# Patient Record
Sex: FEMALE | Race: WHITE | Employment: FULL TIME | URBAN - METROPOLITAN AREA
[De-identification: names, ages, dates, MRNs, and addresses within clinical notes are randomized per-mention and may not be internally consistent; named-entity substitution may affect disease eponyms.]

---

## 2021-11-01 PROCEDURE — 99284 EMERGENCY DEPT VISIT MOD MDM: CPT

## 2021-11-01 PROCEDURE — 75810000059 HC BURN CR DRS/DEB MD 5-10%TBSA

## 2021-11-02 ENCOUNTER — HOSPITAL ENCOUNTER (EMERGENCY)
Age: 41
Discharge: HOME | End: 2021-11-02
Attending: EMERGENCY MEDICINE

## 2021-11-02 VITALS
HEIGHT: 67 IN | BODY MASS INDEX: 21.19 KG/M2 | DIASTOLIC BLOOD PRESSURE: 84 MMHG | SYSTOLIC BLOOD PRESSURE: 122 MMHG | TEMPERATURE: 98.4 F | RESPIRATION RATE: 18 BRPM | WEIGHT: 135 LBS | OXYGEN SATURATION: 99 % | HEART RATE: 98 BPM

## 2021-11-02 DIAGNOSIS — T30.0 BURN: Primary | ICD-10-CM

## 2021-11-02 PROCEDURE — 75810000059 HC BURN CR DRS/DEB MD 5-10%TBSA

## 2021-11-02 RX ORDER — IBUPROFEN 800 MG/1
800 TABLET ORAL
Qty: 30 TABLET | Refills: 0 | Status: SHIPPED | OUTPATIENT
Start: 2021-11-02 | End: 2021-11-12

## 2021-11-02 RX ORDER — TRAMADOL HYDROCHLORIDE 50 MG/1
50 TABLET ORAL
Qty: 9 TABLET | Refills: 0 | Status: SHIPPED | OUTPATIENT
Start: 2021-11-02 | End: 2021-11-05

## 2021-11-02 NOTE — ED NOTES
I have reviewed discharge instructions with the patient. The patient verbalized understanding. Patient left ED via Discharge Method: ambulatory to Home with self    Opportunity for questions and clarification provided. Patient given 2 scripts. To continue your aftercare when you leave the hospital, you may receive an automated call from our care team to check in on how you are doing. This is a free service and part of our promise to provide the best care and service to meet your aftercare needs.  If you have questions, or wish to unsubscribe from this service please call [phone]. Thank you for Choosing our [facility] Emergency Department.

## 2021-11-02 NOTE — LETTER
[address] [address] EMERGENCY DEPT 
[address] [address] [facility] [id number] [phone] Work/School Note Date: 11/1/2021 To Whom It May concern: 
 
 
Rebekah Foley was seen and treated today in the emergency room by the following provider(s): 
Attending Provider: Patrice Ball DO Physician Assistant: RUFUS Goldberg. Rebekah Foley is excused from work/school on 11/02/21. She is clear to return to work/school on 11/03/21. Sincerely, RUFUS Simpson

## 2021-11-02 NOTE — DISCHARGE INSTRUCTIONS
Keep Clean place antibiotic ointment daily, use meds as directed, call office in the morning to arrange follow-up appointment, return to ER symptoms worsen

## 2021-11-02 NOTE — ED PROVIDER NOTES
Patient states Sunday while working she spilled hot coffee grounds on the top of her left hand pain continued today she came in [facility] for further evaluation. She is updated on tetanus    The history is provided by the patient. Burn  The incident occurred yesterday. The burns occurred at the workplace. The burns occurred while cooking. Injury mechanism: Hot coffee grounds and steam. The burns are located on the left hand. The burns appear redness. The pain is at a severity of 5/10. The pain is moderate. She has tried running under water for the symptoms. The treatment provided moderate relief. No past medical history on file. No past surgical history on file. No family history on file. Social History     Socioeconomic History    Marital status: SINGLE     Spouse name: Not on file    Number of children: Not on file    Years of education: Not on file    Highest education level: Not on file   Occupational History    Not on file   Tobacco Use    Smoking status: Not on file   Substance and Sexual Activity    Alcohol use: Not on file    Drug use: Not on file    Sexual activity: Not on file   Other Topics Concern    Not on file   Social History Narrative    Not on file     Social Determinants of Health     Financial Resource Strain:     Difficulty of Paying Living Expenses:    Food Insecurity:     Worried About Running Out of Food in the Last Year:     [address] in the Last Year:    Transportation Needs:     Lack of Transportation (Medical):      Lack of Transportation (Non-Medical):    Physical Activity:     Days of Exercise per Week:     Minutes of Exercise per Session:    Stress:     Feeling of Stress :    Social Connections:     Frequency of Communication with Friends and Family:     Frequency of Social Gatherings with Friends and Family:     Attends Confucianism Services:     Active Member of Clubs or Organizations:     Attends Club or Organization Meetings:     Marital Status:    Intimate Partner Violence:     Fear of Current or Ex-Partner:     Emotionally Abused:     Physically Abused:     Sexually Abused: ALLERGIES: Tegretol [carbamazepine] and Bactrim [sulfamethoprim]    Review of Systems   All other systems reviewed and are negative. Vitals:    11/02/21 0017 11/02/21 0023   BP: 126/82    Pulse: (!) 110    Resp: 20    Temp: 98.4 °F (36.9 °C)    SpO2: 99% 99%   Weight: 61.2 kg (135 lb)    Height: 5' 7\" (1.702 m)             Physical Exam  Vitals and nursing note reviewed. Constitutional:       General: She is not in acute distress. Appearance: Normal appearance. She is well-developed. She is not diaphoretic. HENT:      Head: Normocephalic and atraumatic. Right Ear: External ear normal.      Left Ear: External ear normal.   Eyes:      Pupils: Pupils are equal, round, and reactive to light. Cardiovascular:      Rate and Rhythm: Normal rate and regular rhythm. Pulmonary:      Effort: Pulmonary effort is normal.      Breath sounds: Normal breath sounds. Abdominal:      General: Bowel sounds are normal.      Palpations: Abdomen is soft. Musculoskeletal:         General: Normal range of motion. Cervical back: Normal range of motion and neck supple. Skin:     General: Skin is warm. Comments: Left hand with dorsal redness webspaces are clear you shallow burst blisters at the thumb dried drainage noted range of motion of all fingers no injury to the palm redness does not cross the wrist   Neurological:      General: No focal deficit present. Mental Status: She is alert and oriented to person, place, and time. Psychiatric:         Mood and Affect: Mood normal.         Behavior: Behavior normal.          MDM  Number of Diagnoses or Management Options  Diagnosis management comments: First-degree and slight second.   Burn to dorsal left hand patient is up-to-date on tetanus  Area dressed with antibiotic ointment, patient given 800 mg of Motrin and few tramadol for pain  Work Note given, patient to follow-up at work well       Amount and/or Complexity of Data Reviewed  Review and summarize past medical records: yes    Risk of Complications, Morbidity, and/or Mortality  Presenting problems: low  Diagnostic procedures: low  Management options: low    Patient Progress  Patient progress: improved         Procedures

## 2021-11-27 ENCOUNTER — HOSPITAL ENCOUNTER (EMERGENCY)
Age: 41
Discharge: HOME | End: 2021-11-27
Attending: EMERGENCY MEDICINE

## 2021-11-27 VITALS
RESPIRATION RATE: 16 BRPM | HEART RATE: 119 BPM | OXYGEN SATURATION: 98 % | DIASTOLIC BLOOD PRESSURE: 63 MMHG | WEIGHT: 130 LBS | BODY MASS INDEX: 20.4 KG/M2 | TEMPERATURE: 98.2 F | SYSTOLIC BLOOD PRESSURE: 111 MMHG | HEIGHT: 67 IN

## 2021-11-27 DIAGNOSIS — H92.01 ACUTE EAR PAIN, RIGHT: Primary | ICD-10-CM

## 2021-11-27 DIAGNOSIS — H60.501 ACUTE OTITIS EXTERNA OF RIGHT EAR, UNSPECIFIED TYPE: ICD-10-CM

## 2021-11-27 DIAGNOSIS — H66.90 ACUTE OTITIS MEDIA, UNSPECIFIED OTITIS MEDIA TYPE: ICD-10-CM

## 2021-11-27 PROCEDURE — 99282 EMERGENCY DEPT VISIT SF MDM: CPT

## 2021-11-27 RX ORDER — NEOMYCIN SULFATE, POLYMYXIN B SULFATE AND HYDROCORTISONE 10; 3.5; 1 MG/ML; MG/ML; [USP'U]/ML
4 SUSPENSION/ DROPS AURICULAR (OTIC) 4 TIMES DAILY
Qty: 10 ML | Refills: 0 | Status: SHIPPED | OUTPATIENT
Start: 2021-11-27 | End: 2021-12-07

## 2021-11-27 RX ORDER — DOXYCYCLINE 100 MG/1
100 TABLET ORAL 2 TIMES DAILY
Qty: 20 TABLET | Refills: 0 | Status: SHIPPED | OUTPATIENT
Start: 2021-11-27

## 2021-11-27 RX ORDER — HYDROCODONE BITARTRATE AND ACETAMINOPHEN 5; 325 MG/1; MG/1
1 TABLET ORAL
Qty: 12 TABLET | Refills: 0 | Status: SHIPPED | OUTPATIENT
Start: 2021-11-27 | End: 2021-11-30

## 2021-11-27 NOTE — ED TRIAGE NOTES
Pt states she has been having some swelling on the inside of her R ear. She states slightly runny nose. Patient states she also has been having skin issues but applied make up to face before being triaged.    Ibuprofen 800mg taken PTA

## 2021-11-27 NOTE — ED PROVIDER NOTES
Per nurses notes: \"Pt states she has been having some swelling on the inside of her R ear. She states slightly runny nose. Patient states she also has been having skin issues but applied make up to face before being triaged. Ibuprofen 800mg taken PTA\"    Patient states the acne/skin condition to the face is usually treated with doxycycline. Has a history of staph infections in the past    The history is provided by the patient. Ear Pain   This is a new problem. The current episode started more than 2 days ago. The problem occurs constantly. The problem has been gradually worsening. Patient complains that the right ear is affected. There has been no fever. The pain is at a severity of 9/10. Pertinent negatives include no ear discharge, no headaches, no hearing loss, no rhinorrhea, no sore throat, no abdominal pain, no diarrhea, no vomiting, no neck pain, no cough and no rash. Her past medical history does not include chronic ear infection, hearing loss or tympanostomy tube. History reviewed. No pertinent past medical history. History reviewed. No pertinent surgical history. History reviewed. No pertinent family history.     Social History     Socioeconomic History    Marital status: SINGLE     Spouse name: Not on file    Number of children: Not on file    Years of education: Not on file    Highest education level: Not on file   Occupational History    Not on file   Tobacco Use    Smoking status: Current Every Day Smoker     Packs/day: 0.30    Smokeless tobacco: Never Used   Substance and Sexual Activity    Alcohol use: Not on file    Drug use: Not Currently    Sexual activity: Not on file   Other Topics Concern    Not on file   Social History Narrative    Not on file     Social Determinants of Health     Financial Resource Strain:     Difficulty of Paying Living Expenses: Not on file   Food Insecurity:     Worried About Running Out of Food in the Last Year: Not on file    [employer] in the Last Year: Not on file   Transportation Needs:     Lack of Transportation (Medical): Not on file    Lack of Transportation (Non-Medical): Not on file   Physical Activity:     Days of Exercise per Week: Not on file    Minutes of Exercise per Session: Not on file   Stress:     Feeling of Stress : Not on file   Social Connections:     Frequency of Communication with Friends and Family: Not on file    Frequency of Social Gatherings with Friends and Family: Not on file    Attends Adventism Services: Not on file    Active Member of [address] [place] [address] [employer] or Organizations: Not on file    Attends Club or Organization Meetings: Not on file    Marital Status: Not on file   Intimate Partner Violence:     Fear of Current or Ex-Partner: Not on file    Emotionally Abused: Not on file    Physically Abused: Not on file    Sexually Abused: Not on file   Housing Stability:     Unable to Pay for Housing in the Last Year: Not on file    Number of [place] in the Last Year: Not on file    Unstable Housing in the Last Year: Not on file         ALLERGIES: Tegretol [carbamazepine] and Bactrim [sulfamethoprim]    Review of Systems   Constitutional: Negative for chills and fever. HENT: Positive for ear pain. Negative for ear discharge, hearing loss, rhinorrhea and sore throat. Respiratory: Negative for cough. Gastrointestinal: Negative for abdominal pain, diarrhea and vomiting. Musculoskeletal: Negative for neck pain. Skin: Negative for rash. Neurological: Negative for headaches. All other systems reviewed and are negative. Vitals:    11/27/21 0431   BP: 111/63   Pulse: (!) 119   Resp: 16   Temp: 98.2 °F (36.8 °C)   SpO2: 98%   Weight: 59 kg (130 lb)   Height: 5' 7\" (1.702 m)            Physical Exam  Vitals and nursing note reviewed. Constitutional:       General: She is not in acute distress. Appearance: She is well-developed. HENT:      Head: Normocephalic and atraumatic.       Right Ear: Hearing and external ear normal. Swelling and tenderness present. Tympanic membrane is injected and bulging (With a couple large bullae). Left Ear: Hearing and external ear normal.      Mouth/Throat:      Mouth: Mucous membranes are moist.   Eyes:      Extraocular Movements: Extraocular movements intact. Conjunctiva/sclera: Conjunctivae normal.      Pupils: Pupils are equal, round, and reactive to light. Musculoskeletal:         General: Normal range of motion. Cervical back: Normal range of motion and neck supple. Lymphadenopathy:      Cervical: No cervical adenopathy. Skin:     General: Skin is warm and dry. Capillary Refill: Capillary refill takes less than 2 seconds. Findings: Acne present. Neurological:      Mental Status: She is alert and oriented to person, place, and time.       Gait: Gait normal.   Psychiatric:         Speech: Speech normal.         Cognition and Memory: Cognition and memory normal.          MDM  Number of Diagnoses or Management Options  Acute ear pain, right: new and does not require workup  Acute otitis externa of right ear, unspecified type: new and does not require workup  Acute otitis media, unspecified otitis media type: new and does not require workup     Amount and/or Complexity of Data Reviewed  Review and summarize past medical records: yes    Risk of Complications, Morbidity, and/or Mortality  Presenting problems: low  Diagnostic procedures: minimal  Management options: low    Patient Progress  Patient progress: stable         Procedures

## 2023-01-13 ENCOUNTER — HOSPITAL ENCOUNTER (INPATIENT)
Age: 43
LOS: 1 days | Discharge: LEFT BEFORE DISCHARGE | End: 2023-01-14
Attending: EMERGENCY MEDICINE | Admitting: STUDENT IN AN ORGANIZED HEALTH CARE EDUCATION/TRAINING PROGRAM
Payer: COMMERCIAL

## 2023-01-13 VITALS
RESPIRATION RATE: 18 BRPM | DIASTOLIC BLOOD PRESSURE: 92 MMHG | SYSTOLIC BLOOD PRESSURE: 125 MMHG | OXYGEN SATURATION: 100 % | HEIGHT: 67 IN | HEART RATE: 107 BPM | TEMPERATURE: 98.8 F | WEIGHT: 135 LBS | BODY MASS INDEX: 21.19 KG/M2

## 2023-01-13 DIAGNOSIS — M46.42 DISCITIS OF CERVICAL REGION: Primary | ICD-10-CM

## 2023-01-13 PROBLEM — M46.22 OSTEOMYELITIS OF CERVICAL SPINE (HCC): Status: ACTIVE | Noted: 2023-01-13

## 2023-01-13 LAB
ALBUMIN SERPL-MCNC: 3.7 G/DL (ref 3.5–5)
ALBUMIN/GLOB SERPL: 0.8 (ref 0.4–1.6)
ALP SERPL-CCNC: 101 U/L (ref 50–136)
ALT SERPL-CCNC: 32 U/L (ref 12–65)
AMPHET UR QL SCN: POSITIVE
ANION GAP SERPL CALC-SCNC: 4 MMOL/L (ref 2–11)
AST SERPL-CCNC: 28 U/L (ref 15–37)
BARBITURATES UR QL SCN: NEGATIVE
BASOPHILS # BLD: 0 K/UL (ref 0–0.2)
BASOPHILS NFR BLD: 1 % (ref 0–2)
BENZODIAZ UR QL: POSITIVE
BILIRUB SERPL-MCNC: 0.3 MG/DL (ref 0.2–1.1)
BILIRUB UR QL: NEGATIVE
BUN SERPL-MCNC: 9 MG/DL (ref 6–23)
CALCIUM SERPL-MCNC: 10.5 MG/DL (ref 8.3–10.4)
CANNABINOIDS UR QL SCN: NEGATIVE
CHLORIDE SERPL-SCNC: 102 MMOL/L (ref 101–110)
CO2 SERPL-SCNC: 29 MMOL/L (ref 21–32)
COCAINE UR QL SCN: NEGATIVE
CREAT SERPL-MCNC: 0.7 MG/DL (ref 0.6–1)
CRP SERPL-MCNC: 0.8 MG/DL (ref 0–0.9)
DIFFERENTIAL METHOD BLD: NORMAL
EOSINOPHIL # BLD: 0.2 K/UL (ref 0–0.8)
EOSINOPHIL NFR BLD: 4 % (ref 0.5–7.8)
ERYTHROCYTE [DISTWIDTH] IN BLOOD BY AUTOMATED COUNT: 13.2 % (ref 11.9–14.6)
GLOBULIN SER CALC-MCNC: 4.8 G/DL (ref 2.8–4.5)
GLUCOSE SERPL-MCNC: 83 MG/DL (ref 65–100)
GLUCOSE UR QL STRIP.AUTO: NEGATIVE MG/DL
HCG UR QL: NEGATIVE
HCT VFR BLD AUTO: 40 % (ref 35.8–46.3)
HGB BLD-MCNC: 13.2 G/DL (ref 11.7–15.4)
IMM GRANULOCYTES # BLD AUTO: 0 K/UL (ref 0–0.5)
IMM GRANULOCYTES NFR BLD AUTO: 0 % (ref 0–5)
KETONES UR-MCNC: NEGATIVE MG/DL
LEUKOCYTE ESTERASE UR QL STRIP: NEGATIVE
LYMPHOCYTES # BLD: 1.6 K/UL (ref 0.5–4.6)
LYMPHOCYTES NFR BLD: 28 % (ref 13–44)
MCH RBC QN AUTO: 28.2 PG (ref 26.1–32.9)
MCHC RBC AUTO-ENTMCNC: 33 G/DL (ref 31.4–35)
MCV RBC AUTO: 85.5 FL (ref 82–102)
METHADONE UR QL: NEGATIVE
MONOCYTES # BLD: 0.5 K/UL (ref 0.1–1.3)
MONOCYTES NFR BLD: 8 % (ref 4–12)
NEUTS SEG # BLD: 3.5 K/UL (ref 1.7–8.2)
NEUTS SEG NFR BLD: 59 % (ref 43–78)
NITRITE UR QL: NEGATIVE
NRBC # BLD: 0 K/UL (ref 0–0.2)
OPIATES UR QL: NEGATIVE
PCP UR QL: NEGATIVE
PH UR: 6.5 (ref 5–9)
PLATELET # BLD AUTO: 159 K/UL (ref 150–450)
PMV BLD AUTO: 10.5 FL (ref 9.4–12.3)
POTASSIUM SERPL-SCNC: 3.7 MMOL/L (ref 3.5–5.1)
PROT SERPL-MCNC: 8.5 G/DL (ref 6.3–8.2)
PROT UR QL: NEGATIVE MG/DL
RBC # BLD AUTO: 4.68 M/UL (ref 4.05–5.2)
RBC # UR STRIP: NEGATIVE
SERVICE CMNT-IMP: ABNORMAL
SODIUM SERPL-SCNC: 135 MMOL/L (ref 133–143)
SP GR UR: >1.03 (ref 1–1.02)
UROBILINOGEN UR QL: 0.2 EU/DL (ref 0.2–1)
VANCOMYCIN SERPL-MCNC: 1.2 UG/ML
WBC # BLD AUTO: 5.8 K/UL (ref 4.3–11.1)

## 2023-01-13 PROCEDURE — 80053 COMPREHEN METABOLIC PANEL: CPT

## 2023-01-13 PROCEDURE — 6370000000 HC RX 637 (ALT 250 FOR IP): Performed by: FAMILY MEDICINE

## 2023-01-13 PROCEDURE — 6370000000 HC RX 637 (ALT 250 FOR IP): Performed by: STUDENT IN AN ORGANIZED HEALTH CARE EDUCATION/TRAINING PROGRAM

## 2023-01-13 PROCEDURE — 81003 URINALYSIS AUTO W/O SCOPE: CPT

## 2023-01-13 PROCEDURE — 80202 ASSAY OF VANCOMYCIN: CPT

## 2023-01-13 PROCEDURE — 81025 URINE PREGNANCY TEST: CPT

## 2023-01-13 PROCEDURE — 6360000002 HC RX W HCPCS: Performed by: STUDENT IN AN ORGANIZED HEALTH CARE EDUCATION/TRAINING PROGRAM

## 2023-01-13 PROCEDURE — 87040 BLOOD CULTURE FOR BACTERIA: CPT

## 2023-01-13 PROCEDURE — 85025 COMPLETE CBC W/AUTO DIFF WBC: CPT

## 2023-01-13 PROCEDURE — 2580000003 HC RX 258: Performed by: STUDENT IN AN ORGANIZED HEALTH CARE EDUCATION/TRAINING PROGRAM

## 2023-01-13 PROCEDURE — 99285 EMERGENCY DEPT VISIT HI MDM: CPT

## 2023-01-13 PROCEDURE — 86140 C-REACTIVE PROTEIN: CPT

## 2023-01-13 PROCEDURE — 1100000000 HC RM PRIVATE

## 2023-01-13 PROCEDURE — 80307 DRUG TEST PRSMV CHEM ANLYZR: CPT

## 2023-01-13 RX ORDER — SODIUM CHLORIDE 0.9 % (FLUSH) 0.9 %
5-40 SYRINGE (ML) INJECTION EVERY 12 HOURS SCHEDULED
Status: DISCONTINUED | OUTPATIENT
Start: 2023-01-13 | End: 2023-01-14 | Stop reason: HOSPADM

## 2023-01-13 RX ORDER — ENOXAPARIN SODIUM 100 MG/ML
40 INJECTION SUBCUTANEOUS DAILY
Status: DISCONTINUED | OUTPATIENT
Start: 2023-01-13 | End: 2023-01-14 | Stop reason: HOSPADM

## 2023-01-13 RX ORDER — LORAZEPAM 2 MG/ML
2 INJECTION INTRAMUSCULAR
Status: DISCONTINUED | OUTPATIENT
Start: 2023-01-13 | End: 2023-01-14 | Stop reason: HOSPADM

## 2023-01-13 RX ORDER — LORAZEPAM 2 MG/ML
1 INJECTION INTRAMUSCULAR
Status: DISCONTINUED | OUTPATIENT
Start: 2023-01-13 | End: 2023-01-14 | Stop reason: HOSPADM

## 2023-01-13 RX ORDER — SODIUM CHLORIDE 0.9 % (FLUSH) 0.9 %
5-40 SYRINGE (ML) INJECTION PRN
Status: DISCONTINUED | OUTPATIENT
Start: 2023-01-13 | End: 2023-01-14 | Stop reason: HOSPADM

## 2023-01-13 RX ORDER — LORAZEPAM 2 MG/ML
4 INJECTION INTRAMUSCULAR
Status: DISCONTINUED | OUTPATIENT
Start: 2023-01-13 | End: 2023-01-14 | Stop reason: HOSPADM

## 2023-01-13 RX ORDER — SODIUM CHLORIDE 9 MG/ML
INJECTION, SOLUTION INTRAVENOUS PRN
Status: DISCONTINUED | OUTPATIENT
Start: 2023-01-13 | End: 2023-01-14 | Stop reason: HOSPADM

## 2023-01-13 RX ORDER — LORAZEPAM 1 MG/1
4 TABLET ORAL
Status: DISCONTINUED | OUTPATIENT
Start: 2023-01-13 | End: 2023-01-14 | Stop reason: HOSPADM

## 2023-01-13 RX ORDER — ACETAMINOPHEN 650 MG/1
650 SUPPOSITORY RECTAL EVERY 6 HOURS PRN
Status: DISCONTINUED | OUTPATIENT
Start: 2023-01-13 | End: 2023-01-14 | Stop reason: HOSPADM

## 2023-01-13 RX ORDER — LORAZEPAM 0.5 MG/1
1 TABLET ORAL
Status: DISCONTINUED | OUTPATIENT
Start: 2023-01-13 | End: 2023-01-14 | Stop reason: HOSPADM

## 2023-01-13 RX ORDER — LORAZEPAM 1 MG/1
3 TABLET ORAL
Status: DISCONTINUED | OUTPATIENT
Start: 2023-01-13 | End: 2023-01-14 | Stop reason: HOSPADM

## 2023-01-13 RX ORDER — LORAZEPAM 0.5 MG/1
2 TABLET ORAL
Status: DISCONTINUED | OUTPATIENT
Start: 2023-01-13 | End: 2023-01-14 | Stop reason: HOSPADM

## 2023-01-13 RX ORDER — ONDANSETRON 2 MG/ML
4 INJECTION INTRAMUSCULAR; INTRAVENOUS EVERY 6 HOURS PRN
Status: DISCONTINUED | OUTPATIENT
Start: 2023-01-13 | End: 2023-01-14 | Stop reason: HOSPADM

## 2023-01-13 RX ORDER — SODIUM CHLORIDE 9 MG/ML
INJECTION, SOLUTION INTRAVENOUS CONTINUOUS
Status: DISCONTINUED | OUTPATIENT
Start: 2023-01-13 | End: 2023-01-14 | Stop reason: HOSPADM

## 2023-01-13 RX ORDER — ONDANSETRON 4 MG/1
4 TABLET, ORALLY DISINTEGRATING ORAL EVERY 8 HOURS PRN
Status: DISCONTINUED | OUTPATIENT
Start: 2023-01-13 | End: 2023-01-14 | Stop reason: HOSPADM

## 2023-01-13 RX ORDER — ACETAMINOPHEN 325 MG/1
650 TABLET ORAL EVERY 6 HOURS PRN
Status: DISCONTINUED | OUTPATIENT
Start: 2023-01-13 | End: 2023-01-14 | Stop reason: HOSPADM

## 2023-01-13 RX ORDER — LORAZEPAM 2 MG/ML
3 INJECTION INTRAMUSCULAR
Status: DISCONTINUED | OUTPATIENT
Start: 2023-01-13 | End: 2023-01-14 | Stop reason: HOSPADM

## 2023-01-13 RX ORDER — METHADONE HYDROCHLORIDE 10 MG/1
10 TABLET ORAL EVERY 4 HOURS PRN
COMMUNITY

## 2023-01-13 RX ORDER — LANOLIN ALCOHOL/MO/W.PET/CERES
100 CREAM (GRAM) TOPICAL DAILY
Status: DISCONTINUED | OUTPATIENT
Start: 2023-01-13 | End: 2023-01-14 | Stop reason: HOSPADM

## 2023-01-13 RX ORDER — POLYETHYLENE GLYCOL 3350 17 G/17G
17 POWDER, FOR SOLUTION ORAL DAILY PRN
Status: DISCONTINUED | OUTPATIENT
Start: 2023-01-13 | End: 2023-01-14 | Stop reason: HOSPADM

## 2023-01-13 RX ORDER — NICOTINE 21 MG/24HR
1 PATCH, TRANSDERMAL 24 HOURS TRANSDERMAL DAILY PRN
Status: DISCONTINUED | OUTPATIENT
Start: 2023-01-13 | End: 2023-01-14 | Stop reason: HOSPADM

## 2023-01-13 RX ORDER — MULTIVITAMIN WITH IRON
1 TABLET ORAL DAILY
Status: DISCONTINUED | OUTPATIENT
Start: 2023-01-13 | End: 2023-01-14 | Stop reason: HOSPADM

## 2023-01-13 RX ORDER — SODIUM CHLORIDE 9 MG/ML
INJECTION, SOLUTION INTRAVENOUS PRN
Status: DISCONTINUED | OUTPATIENT
Start: 2023-01-13 | End: 2023-01-13 | Stop reason: SDUPTHER

## 2023-01-13 RX ADMIN — ACETAMINOPHEN 650 MG: 325 TABLET ORAL at 21:56

## 2023-01-13 RX ADMIN — CEFEPIME 2000 MG: 2 INJECTION, POWDER, FOR SOLUTION INTRAVENOUS at 18:42

## 2023-01-13 RX ADMIN — VANCOMYCIN HYDROCHLORIDE 1500 MG: 100 INJECTION, POWDER, LYOPHILIZED, FOR SOLUTION INTRAVENOUS at 19:44

## 2023-01-13 RX ADMIN — SODIUM CHLORIDE: 9 INJECTION, SOLUTION INTRAVENOUS at 15:10

## 2023-01-13 RX ADMIN — B-COMPLEX W/ C & FOLIC ACID TAB 1 TABLET: TAB at 17:56

## 2023-01-13 RX ADMIN — Medication 100 MG: at 17:56

## 2023-01-13 RX ADMIN — SODIUM CHLORIDE: 9 INJECTION, SOLUTION INTRAVENOUS at 17:57

## 2023-01-13 RX ADMIN — ENOXAPARIN SODIUM 40 MG: 100 INJECTION SUBCUTANEOUS at 15:10

## 2023-01-13 ASSESSMENT — PAIN DESCRIPTION - DESCRIPTORS: DESCRIPTORS: ACHING

## 2023-01-13 ASSESSMENT — PAIN - FUNCTIONAL ASSESSMENT: PAIN_FUNCTIONAL_ASSESSMENT: ACTIVITIES ARE NOT PREVENTED

## 2023-01-13 ASSESSMENT — ENCOUNTER SYMPTOMS
SHORTNESS OF BREATH: 0
COUGH: 0
CHEST TIGHTNESS: 0

## 2023-01-13 ASSESSMENT — PAIN DESCRIPTION - LOCATION: LOCATION: NECK

## 2023-01-13 ASSESSMENT — PAIN SCALES - GENERAL
PAINLEVEL_OUTOF10: 5
PAINLEVEL_OUTOF10: 9

## 2023-01-13 ASSESSMENT — PAIN DESCRIPTION - ORIENTATION: ORIENTATION: MID

## 2023-01-13 NOTE — ED PROVIDER NOTES
Emergency Department Provider Note                   PCP:                None None               Age: 38 y.o. Sex: female       ICD-10-CM    1. Discitis of cervical region  M46.42           DISPOSITION Decision To Admit 01/13/2023 11:16:37 AM        Medical Decision Making  Patient without a clear history other than test results demonstrating discitis. Patient has been seen at multiple hospitals and apparently left AGAINST MEDICAL ADVICE from each hospital and occasionally using an alias. Will repeat blood work including blood cultures, discussed with hospitalist for possible admission and continued IV vancomycin. Amount and/or Complexity of Data Reviewed  Labs: ordered. Decision-making details documented in ED Course. Risk  Decision regarding hospitalization. Complexity of Problem: 1 acute uncomplicated illness requiring admission. (3)      I have reviewed records from an external source: ED records from outside this hospital.  I have reviewed records from an external source: previous lab results from outside ED. I have reviewed records from an external source: previous imaging studies from outside ED. I have reviewed records from an external source: [facility]          Patient was admitted I have communication with admitting physician. Orders Placed This Encounter   Procedures    Culture, Blood 1    CBC with Auto Differential    Comprehensive Metabolic Panel    C-Reactive Protein    Urine Drug Screen    POCT Urine Dipstick    POC PREGNANCY UR-QUAL    POCT Urinalysis no Micro    POC Pregnancy Urine Qual        Medications - No data to display    New Prescriptions    No medications on file        Ruth Arias is a 38 y.o. female who presents to the Emergency Department with chief complaint of    Chief Complaint   Patient presents with    Osteomyelitis       Patient presents to the emergency department with a complaint of neck pain.   She states she was just hospitalized at [facility] but left AGAINST MEDICAL ADVICE because she did not like how she was being treated. Stating that she was in the hallway and had only had 2 meals in 2 days. She had an history of heroin overdose and seen in the emergency department and subsequently diagnosed with discitis and possibly osteomyelitis of the cervical spine on MRI. She was receiving IV vancomycin prior to her leaving AMA. Patient gives a somewhat disjointed and confusing history. Review of the medical record from [facility] indicates patient had been admitted previously at our facility with the same diagnosis and the patient states that she used her cousin's name during that admission. A search of medical records under her cousin's name does not confirm this admission. She states that she left [place] tomorrow yesterday and has been at home until she could get a ride to this hospital.  She denies any paresthesias, fever or chills. Review of medical records also indicate a echocardiogram performed with some nonspecific inflammation of aortic valve leaflet although vegetation cannot be ruled out. The history is provided by the patient and medical records. Review of Systems   Constitutional:  Negative for chills and fever. Respiratory:  Negative for cough, chest tightness and shortness of breath. Cardiovascular:  Negative for chest pain. Musculoskeletal:  Positive for neck pain. All other systems reviewed and are negative. No past medical history on file. No past surgical history on file. No family history on file.      Social History     Socioeconomic History    Marital status: Single   Tobacco Use    Smoking status: Every Day     Packs/day: 0.30     Types: Cigarettes    Smokeless tobacco: Never   Substance and Sexual Activity    Drug use: Not Currently         Carbamazepine and Sulfamethoxazole-trimethoprim     Previous Medications    DOXYCYCLINE MONOHYDRATE (ADOXA) 100 MG TABLET    Take 100 mg by mouth 2 times daily        Vitals signs and nursing note reviewed. Patient Vitals for the past 4 hrs:   Temp Pulse Resp BP SpO2   01/13/23 0939 98 °F (36.7 °C) (!) 102 18 126/88 96 %          Physical Exam  Vitals and nursing note reviewed. Constitutional:       General: She is not in acute distress. Appearance: Normal appearance. She is normal weight. She is not ill-appearing, toxic-appearing or diaphoretic. HENT:      Head: Normocephalic and atraumatic. Eyes:      Extraocular Movements: Extraocular movements intact. Conjunctiva/sclera: Conjunctivae normal.      Pupils: Pupils are equal, round, and reactive to light. Cardiovascular:      Rate and Rhythm: Regular rhythm. Tachycardia present. Pulmonary:      Effort: Pulmonary effort is normal.   Abdominal:      General: There is no distension. Palpations: Abdomen is soft. Tenderness: There is no abdominal tenderness. There is no right CVA tenderness, left CVA tenderness or guarding. Musculoskeletal:         General: Normal range of motion. Cervical back: Normal range of motion and neck supple. Tenderness present. No rigidity. Lymphadenopathy:      Cervical: No cervical adenopathy. Skin:     General: Skin is warm and dry. Capillary Refill: Capillary refill takes less than 2 seconds. Neurological:      General: No focal deficit present. Mental Status: She is alert and oriented to person, place, and time. Mental status is at baseline. Psychiatric:         Mood and Affect: Mood normal.         Behavior: Behavior normal.         Thought Content:  Thought content normal.        Procedures    Results for orders placed or performed during the hospital encounter of 01/13/23   Culture, Blood 1    Specimen: Blood   Result Value Ref Range    Special Requests RIGHT  FOREARM        Culture PENDING    CBC with Auto Differential   Result Value Ref Range    WBC 5.8 4.3 - 11.1 K/uL    RBC 4.68 4.05 - 5.2 M/uL    Hemoglobin 13.2 11.7 - 15.4 g/dL    Hematocrit 40.0 35.8 - 46.3 %    MCV 85.5 82 - 102 FL    MCH 28.2 26.1 - 32.9 PG    MCHC 33.0 31.4 - 35.0 g/dL    RDW 13.2 11.9 - 14.6 %    Platelets 197 839 - 594 K/uL    MPV 10.5 9.4 - 12.3 FL    nRBC 0.00 0.0 - 0.2 K/uL    Differential Type AUTOMATED      Seg Neutrophils 59 43 - 78 %    Lymphocytes 28 13 - 44 %    Monocytes 8 4.0 - 12.0 %    Eosinophils % 4 0.5 - 7.8 %    Basophils 1 0.0 - 2.0 %    Immature Granulocytes 0 0.0 - 5.0 %    Segs Absolute 3.5 1.7 - 8.2 K/UL    Absolute Lymph # 1.6 0.5 - 4.6 K/UL    Absolute Mono # 0.5 0.1 - 1.3 K/UL    Absolute Eos # 0.2 0.0 - 0.8 K/UL    Basophils Absolute 0.0 0.0 - 0.2 K/UL    Absolute Immature Granulocyte 0.0 0.0 - 0.5 K/UL   Comprehensive Metabolic Panel   Result Value Ref Range    Sodium 135 133 - 143 mmol/L    Potassium 3.7 3.5 - 5.1 mmol/L    Chloride 102 101 - 110 mmol/L    CO2 29 21 - 32 mmol/L    Anion Gap 4 2 - 11 mmol/L    Glucose 83 65 - 100 mg/dL    BUN 9 6 - 23 MG/DL    Creatinine 0.70 0.6 - 1.0 MG/DL    Est, Glom Filt Rate >60 >60 ml/min/1.73m2    Calcium 10.5 (H) 8.3 - 10.4 MG/DL    Total Bilirubin 0.3 0.2 - 1.1 MG/DL    ALT 32 12 - 65 U/L    AST 28 15 - 37 U/L    Alk Phosphatase 101 50 - 136 U/L    Total Protein 8.5 (H) 6.3 - 8.2 g/dL    Albumin 3.7 3.5 - 5.0 g/dL    Globulin 4.8 (H) 2.8 - 4.5 g/dL    Albumin/Globulin Ratio 0.8 0.4 - 1.6     C-Reactive Protein   Result Value Ref Range    CRP 0.8 0.0 - 0.9 mg/dL   Urine Drug Screen   Result Value Ref Range    PCP, Urine Negative NEG      Benzodiazepines, Urine Positive (A) NEG      Cocaine, Urine Negative NEG      Amphetamine, Urine Positive (A) NEG      Methadone, Urine Negative NEG      THC, TH-Cannabinol, Urine Negative NEG      Opiates, Urine Negative NEG      Barbiturates, Urine Negative NEG     POCT Urinalysis no Micro   Result Value Ref Range    Specific Gravity, Urine, POC >1.030 (H) 1.001 - 1.023    pH, Urine, POC 6.5 5.0 - 9.0      Protein, Urine, POC Negative NEG mg/dL Glucose, UA POC Negative NEG mg/dL    Ketones, Urine, POC Negative NEG mg/dL    Bilirubin, Urine, POC Negative NEG      Blood, UA POC Negative NEG      URINE UROBILINOGEN POC 0.2 0.2 - 1.0 EU/dL    Nitrate, Urine, POC Negative NEG      Leukocyte Est, UA POC Negative NEG      Performed by: Lavonne Rey    POC Pregnancy Urine Qual   Result Value Ref Range    Preg Test, Ur Negative NEG          No orders to display                       Voice dictation software was used during the making of this note. This software is not perfect and grammatical and other typographical errors may be present. This note has not been completely proofread for errors.      Yaneth Pulido, DO  01/13/23 1127

## 2023-01-13 NOTE — ED TRIAGE NOTES
Pt arrives ambulatory from home. States she was seen at [facility] and dx'd with cervical osteomyelitis.   Left AMA

## 2023-01-13 NOTE — ED NOTES
TRANSFER - OUT REPORT:    Verbal report given to [facility] on Tim Osorio  being transferred to 7th floor for routine progression of patient care       Report consisted of patient's Situation, Background, Assessment and   Recommendations(SBAR). Information from the following report(s) ED SBAR was reviewed with the receiving nurse. [place] Assessment: No data recorded  Lines:   Peripheral IV 01/13/23 Right Forearm (Active)   Site Assessment Clean, dry & intact 01/13/23 1017   Line Status Blood return noted 01/13/23 1017   Phlebitis Assessment No symptoms 01/13/23 1017   Infiltration Assessment 0 01/13/23 1017        Opportunity for questions and clarification was provided.       Patient transported with:  Monitor           Gini Conway RN  01/13/23 2981

## 2023-01-13 NOTE — H&P
Hospitalist History and Physical   Admit Date:  2023 10:23 AM   Name:  Misael Ramires   Age:  38 y.o. Sex:  female  :  1980   MRN:  [id number]   Room:  [place] [address]    Presenting Complaint: Osteomyelitis      Reason(s) for Admission: Osteomyelitis of cervical spine ([facility] [M46.22]     History of Present Illness:   Misael Ramires is a 38 y.o. female with medical history of Polysubstance abuse presents to the ED with a complaint of neck pain. She is a very poor historian. History is obtained from her girlfriend at the bedside. As per girlfriend, patient had a heroin overdose 4-5 days back. She was unresponsive and was laying on the floor. She hit her head. Her face was purplish in color with clenched teeth and drooling of the eyes. She called EMS and they gave her instructions to perform CPR. She performed CPR for 9 minutes. EMS gave her 3 doses of Narcan and she woke up. She was saturating 80% at that time. She was sent to [facility] but left  [address] because she did not like how she was being treated and accused them that they were not telling her what was happening with her and didn't discuss about the imagings done there. She was diagnosed with discitis and possibly osteomyelitis of the cervical spine on MRI. She was receiving IV vancomycin prior to her leaving AMA. She was complaining of subjective fever and was taking ibuprofen. She is also complaining of chest pain but it was due to CPR performed on her. Patient stated she took heroine 5 days back and it was $20 worth. She was asking for pain medication for her neck pain. Patient denies chills, nausea, vomiting, diarrhea. Assessment & Plan:   Osteomyelitis of cervical spine (HCC)  Subjective fever at home, no leukocytosis  MRI of the thoracic and  lumbar spine was normal at [facility].  MRI Findings concerning for osteomyelitis discitis at C2-C3.    echocardiogram performed with some nonspecific inflammation of aortic valve leaflet although vegetation cannot be ruled out. Continue vancomycin  Follow-up with blood cultures  ID consulted  Cardiology consulted    Polysubstance abuse  U tox positive for amphetamines and opiates  Security checked her belongings and found mx used insulin needles, old angiocaths, various unused angiocaths, lighters.  consulted        Anticipated discharge needs:   Anticipate hospital stay for 1 to 2 days    Diet: ADULT DIET; Regular  VTE ppx: Lovenox  Code status: Full Code    Hospital Problems:  Principal Problem:    Osteomyelitis of cervical spine ([address]  Resolved Problems:    * No resolved hospital problems. *       Past History:     No past medical history on file. No past surgical history on file. Social History     Tobacco Use    Smoking status: Every Day     Packs/day: 0.30     Types: Cigarettes    Smokeless tobacco: Never   Substance Use Topics    Alcohol use: Not on file      Social History     Substance and Sexual Activity   Drug Use Not Currently       No family history on file. There is no immunization history on file for this patient. Allergies   Allergen Reactions    Carbamazepine Other (See Comments)    Sulfamethoxazole-Trimethoprim Shortness Of Breath     Prior to Admit Medications:  Current Outpatient Medications   Medication Instructions    doxycycline monohydrate (ADOXA) 100 mg, Oral, 2 TIMES DAILY         Objective:   Patient Vitals for the past 24 hrs:   Temp Pulse Resp BP SpO2   01/13/23 0939 98 °F (36.7 °C) (!) 102 18 126/88 96 %       Oxygen Therapy  SpO2: 96 %  O2 Device: None (Room air)    Estimated body mass index is 21.14 kg/m² as calculated from the following:    Height as of this encounter: 5' 7\" (1.702 m). Weight as of this encounter: 135 lb (61.2 kg).   No intake or output data in the 24 hours ending 01/13/23 1337      Physical Exam:    Blood pressure 126/88, pulse (!) 102, temperature 98 °F (36.7 °C), temperature source Oral, resp. rate 18, height 5' 7\" (1.702 m), weight 135 lb (61.2 kg), SpO2 96 %. General:    Well nourished. Head:  Normocephalic, atraumatic, multiple whitish spots on the face. Eyes:  Sclerae appear normal.  Pupils equally round. ENT:  Nares appear normal.  Moist oral mucosa  Neck:  No restricted ROM. Trachea midline, Skin nodule on the back of the neck, non tender, no warmth  CV:   RRR. No m/r/g. No jugular venous distension. Lungs:   CTAB. No wheezing, rhonchi, or rales. Symmetric expansion. Abdomen:   Soft, nontender, nondistended. Extremities: No cyanosis or clubbing. No edema  Skin:     No rashes and normal coloration. Warm and dry. Neuro:  CN II-XII grossly intact. Sensation intact. Psych:  Normal mood and affect.       I have personally reviewed labs and tests showing:  Recent Labs:  Recent Results (from the past 24 hour(s))   CBC with Auto Differential    Collection Time: 01/13/23  9:58 AM   Result Value Ref Range    WBC 5.8 4.3 - 11.1 K/uL    RBC 4.68 4.05 - 5.2 M/uL    Hemoglobin 13.2 11.7 - 15.4 g/dL    Hematocrit 40.0 35.8 - 46.3 %    MCV 85.5 82 - 102 FL    MCH 28.2 26.1 - 32.9 PG    MCHC 33.0 31.4 - 35.0 g/dL    RDW 13.2 11.9 - 14.6 %    Platelets 115 101 - 207 K/uL    MPV 10.5 9.4 - 12.3 FL    nRBC 0.00 0.0 - 0.2 K/uL    Differential Type AUTOMATED      Seg Neutrophils 59 43 - 78 %    Lymphocytes 28 13 - 44 %    Monocytes 8 4.0 - 12.0 %    Eosinophils % 4 0.5 - 7.8 %    Basophils 1 0.0 - 2.0 %    Immature Granulocytes 0 0.0 - 5.0 %    Segs Absolute 3.5 1.7 - 8.2 K/UL    Absolute Lymph # 1.6 0.5 - 4.6 K/UL    Absolute Mono # 0.5 0.1 - 1.3 K/UL    Absolute Eos # 0.2 0.0 - 0.8 K/UL    Basophils Absolute 0.0 0.0 - 0.2 K/UL    Absolute Immature Granulocyte 0.0 0.0 - 0.5 K/UL   Comprehensive Metabolic Panel    Collection Time: 01/13/23  9:58 AM   Result Value Ref Range    Sodium 135 133 - 143 mmol/L    Potassium 3.7 3.5 - 5.1 mmol/L    Chloride 102 101 - 110 mmol/L    CO2 29 21 - 32 mmol/L    Anion Gap 4 2 - 11 mmol/L    Glucose 83 65 - 100 mg/dL    BUN 9 6 - 23 MG/DL    Creatinine 0.70 0.6 - 1.0 MG/DL    Est, Glom Filt Rate >60 >60 ml/min/1.73m2    Calcium 10.5 (H) 8.3 - 10.4 MG/DL    Total Bilirubin 0.3 0.2 - 1.1 MG/DL    ALT 32 12 - 65 U/L    AST 28 15 - 37 U/L    Alk Phosphatase 101 50 - 136 U/L    Total Protein 8.5 (H) 6.3 - 8.2 g/dL    Albumin 3.7 3.5 - 5.0 g/dL    Globulin 4.8 (H) 2.8 - 4.5 g/dL    Albumin/Globulin Ratio 0.8 0.4 - 1.6     C-Reactive Protein    Collection Time: 01/13/23  9:58 AM   Result Value Ref Range    CRP 0.8 0.0 - 0.9 mg/dL   Culture, Blood 1    Collection Time: 01/13/23  9:58 AM    Specimen: Blood   Result Value Ref Range    Special Requests RIGHT  FOREARM        Culture PENDING    Urine Drug Screen    Collection Time: 01/13/23  9:58 AM   Result Value Ref Range    PCP, Urine Negative NEG      Benzodiazepines, Urine Positive (A) NEG      Cocaine, Urine Negative NEG      Amphetamine, Urine Positive (A) NEG      Methadone, Urine Negative NEG      THC, TH-Cannabinol, Urine Negative NEG      Opiates, Urine Negative NEG      Barbiturates, Urine Negative NEG     Vancomycin Level, Random    Collection Time: 01/13/23  9:58 AM   Result Value Ref Range    Vancomycin Rm 1.2 UG/ML   POCT Urinalysis no Micro    Collection Time: 01/13/23 10:01 AM   Result Value Ref Range    Specific Gravity, Urine, POC >1.030 (H) 1.001 - 1.023    pH, Urine, POC 6.5 5.0 - 9.0      Protein, Urine, POC Negative NEG mg/dL    Glucose, UA POC Negative NEG mg/dL    Ketones, Urine, POC Negative NEG mg/dL    Bilirubin, Urine, POC Negative NEG      Blood, UA POC Negative NEG      URINE UROBILINOGEN POC 0.2 0.2 - 1.0 EU/dL    Nitrate, Urine, POC Negative NEG      Leukocyte Est, UA POC Negative NEG      Performed by: [facility] [place]    POC Pregnancy Urine Qual    Collection Time: 01/13/23 10:03 AM   Result Value Ref Range    Preg Test, Ur Negative NEG         I have personally reviewed imaging studies showing:  No results found. Echocardiogram:  No results found for this or any previous visit. Orders Placed This Encounter   Medications    sodium chloride flush 0.9 % injection 5-40 mL    sodium chloride flush 0.9 % injection 5-40 mL    0.9 % sodium chloride infusion    enoxaparin (LOVENOX) injection 40 mg     Order Specific Question:   Indication of Use     Answer:   Prophylaxis-DVT/PE    OR Linked Order Group     ondansetron (ZOFRAN-ODT) disintegrating tablet 4 mg     ondansetron (ZOFRAN) injection 4 mg    polyethylene glycol (GLYCOLAX) packet 17 g    OR Linked Order Group     acetaminophen (TYLENOL) tablet 650 mg     acetaminophen (TYLENOL) suppository 650 mg         Signed:  Tamy Spangler MD    Part of this note may have been written by using a voice dictation software. The note has been proof read but may still contain some grammatical/other typographical errors.

## 2023-01-13 NOTE — ED NOTES
Physician concerned for substance abuse, security officers checked patient belongings, mx used insulin needles, old angiocaths, various unused angiocaths, lighters and cupped tin foil found in patient belongings, lighters removed, all other items disposed of.       Gini Conway RN  01/13/23 2145

## 2023-01-14 NOTE — PROGRESS NOTES
Pt left against medical leave but refused to sign form. Pt was informed of the risks of leaving ama. Hospitalist Dr. Stanford Etienne notified. House supervisor notified. Pt voiced understanding of the risks of leaving. This was witnessed by myself, charge nurse, house supervisor, and security. All Ivs and lines were removed. Denies pain and discomfort at time. No signs and symptoms of acute distress. Escorted off premises by security.

## 2023-01-14 NOTE — PROGRESS NOTES
VANCO DAILY FOLLOW UP NOTE  [address] [facility] Pharmacokinetic Monitoring Service - Vancomycin    Consulting Provider: Dr. Shahbaz Velasco   Indication: Suspected Osteomyelitis  Target Concentration: Goal AUC/NICOLE 400-600 mg*hr/L  Day of Therapy: 1  Additional Antimicrobials: cefepime    Patient eligible for piperacillin-tazobactam to cefepime auto-substitution per P&T approved protocol? N/A    Pertinent Laboratory Values: Wt Readings from Last 1 Encounters:   01/13/23 135 lb (61.2 kg)     Temp Readings from Last 1 Encounters:   01/13/23 98.1 °F (36.7 °C) (Oral)     Recent Labs     01/13/23  0958   BUN 9   CREATININE 0.70   WBC 5.8     Estimated Creatinine Clearance: 103 mL/min (based on SCr of 0.7 mg/dL). Lab Results   Component Value Date/Time    VANCORANDOM 1.2 01/13/2023 09:58 AM       MRSA Nasal Swab: N/A. Non-respiratory infection      Assessment:  Date/Time Dose Concentration AUC         Note: Serum concentrations collected for AUC dosing may appear elevated if collected in close proximity to the dose administered, this is not necessarily an indication of toxicity    Plan:  Dosing recommendations based on [address]  Patient was given vancomycin at [facility] before leaving [facility]. Random level @ 0958 resulted at 1.2. Will reload patient.   Start vancomycin 1500 mg x 1, then 1000 mg q12h  Anticipated AUC of 498 and trough concentration of 14.6 at steady state  Renal labs as indicated   Vancomycin concentrations will be ordered as clinically appropriate   Pharmacy will continue to monitor patient and adjust therapy as indicated    Thank you for the consult,  Darylene Ee, [facility]

## 2023-01-14 NOTE — PLAN OF CARE
Problem: Discharge Planning  Goal: Discharge to home or other facility with appropriate resources  Outcome: Progressing  Flowsheets (Taken 1/13/2023 1949)  Discharge to home or other facility with appropriate resources: Identify barriers to discharge with patient and caregiver     Problem: Pain  Goal: Verbalizes/displays adequate comfort level or baseline comfort level  Outcome: Progressing  Flowsheets (Taken 1/13/2023 2156)  Verbalizes/displays adequate comfort level or baseline comfort level:   Assess pain using appropriate pain scale   Consider cultural and social influences on pain and pain management     Problem: Risk for Elopement  Goal: Patient will not exit the unit/facility without proper excort  Outcome: Progressing  Flowsheets  Taken 1/13/2023 2300  Nursing Interventions for Elopement Risk: Communicate/escalate to charge nurse the risk of elopement  Taken 1/13/2023 1949  Nursing Interventions for Elopement Risk: Assist with personal care needs such as toileting, eating, dressing, as needed to reduce the risk of wandering     Problem: Skin/Tissue Integrity - Adult  Goal: Skin integrity remains intact  Outcome: Progressing  Flowsheets (Taken 1/13/2023 1949)  Skin Integrity Remains Intact: Monitor for areas of redness and/or skin breakdown  Goal: Incisions, wounds, or drain sites healing without S/S of infection  Outcome: Progressing  Flowsheets (Taken 1/13/2023 1949)  Incisions, Wounds, or Drain Sites Healing Without Sign and Symptoms of Infection: ADMISSION and DAILY: Assess and document risk factors for pressure ulcer development  Goal: Oral mucous membranes remain intact  Outcome: Progressing  Flowsheets (Taken 1/13/2023 1949)  Oral Mucous Membranes Remain Intact: Assess oral mucosa and hygiene practices     Problem: Coping  Goal: Pt/Family able to verbalize concerns and demonstrate effective coping strategies  Description: INTERVENTIONS:  1.  Assist patient/family to identify coping skills, available support systems and cultural and spiritual values  2. Provide emotional support, including active listening and acknowledgement of concerns of patient and caregivers  3. Reduce environmental stimuli, as able  4. Instruct patient/family in relaxation techniques, as appropriate  5. Assess for spiritual pain/suffering and initiate Spiritual Care, Psychosocial Clinical Specialist consults as needed  Outcome: Progressing  Flowsheets (Taken 1/13/2023 1949)  Patient/family able to verbalize anxieties, fears, and concerns, and demonstrate effective coping: Assist patient/family to identify coping skills, available support systems and cultural and spiritual values     Problem: Behavior  Goal: Pt/Family maintain appropriate behavior and adhere to behavioral management agreement, if implemented  Description: INTERVENTIONS:  1. Assess patient/family's coping skills and  non-compliant behavior (including use of illegal substances)  2. Notify security of behavior or suspected illegal substances which indicate the need for search of the family and/or belongings  3. Encourage verbalization of thoughts and concerns in a socially appropriate manner  4. Utilize positive, consistent limit setting strategies supporting safety of patient, staff and others  5. Encourage participation in the decision making process about the behavioral management agreement  6. If a visitor's behavior poses a threat to safety call refer to organization policy.   7. Initiate consult with , Psychosocial CNS, Spiritual Care as appropriate  Outcome: Progressing  Flowsheets (Taken 1/13/2023 1949)  Patient/family maintains appropriate behavior and adheres to behavioral management agreement, if implemented: Assess patient/familys coping skills and  non-compliant behavior (including use of illegal substances)     Problem: Drug Abuse/Detox  Goal: Will have no detox symptoms and will verbalize plan for changing drug-related behavior  Description: INTERVENTIONS:  1. Administer medication as ordered  2. Monitor physical status  3. Provide emotional support with 1:1 interaction with staff  4.  Encourage  recovery focused treatment   Outcome: Progressing  Flowsheets (Taken 1/13/2023 1949)  Will have no detox symptoms and will verbalize plan for changing drug-related behavior: Administer medication as ordered

## 2023-01-15 LAB
BACTERIA SPEC CULT: NORMAL
SERVICE CMNT-IMP: NORMAL

## 2023-01-18 LAB
BACTERIA SPEC CULT: NORMAL
SERVICE CMNT-IMP: NORMAL

## 2023-03-22 ENCOUNTER — APPOINTMENT (OUTPATIENT)
Dept: MRI IMAGING | Age: 43
End: 2023-03-22
Payer: COMMERCIAL

## 2023-03-22 ENCOUNTER — HOSPITAL ENCOUNTER (EMERGENCY)
Age: 43
Discharge: ANOTHER HOSPITAL | End: 2023-03-23
Attending: STUDENT IN AN ORGANIZED HEALTH CARE EDUCATION/TRAINING PROGRAM
Payer: COMMERCIAL

## 2023-03-22 VITALS
DIASTOLIC BLOOD PRESSURE: 70 MMHG | RESPIRATION RATE: 11 BRPM | OXYGEN SATURATION: 98 % | HEART RATE: 88 BPM | TEMPERATURE: 97.8 F | SYSTOLIC BLOOD PRESSURE: 125 MMHG

## 2023-03-22 DIAGNOSIS — M54.2 NECK PAIN ON RIGHT SIDE: ICD-10-CM

## 2023-03-22 DIAGNOSIS — M86.9 OSTEOMYELITIS OF OTHER SITE, UNSPECIFIED TYPE (HCC): Primary | ICD-10-CM

## 2023-03-22 DIAGNOSIS — F15.10 METHAMPHETAMINE USE (HCC): ICD-10-CM

## 2023-03-22 PROBLEM — M46.42 DISCITIS OF CERVICAL REGION: Status: ACTIVE | Noted: 2023-01-11

## 2023-03-22 PROBLEM — T40.1X1A ACCIDENTAL OVERDOSE OF HEROIN (HCC): Status: ACTIVE | Noted: 2023-01-11

## 2023-03-22 LAB
ALBUMIN SERPL-MCNC: 4.2 G/DL (ref 3.5–5)
ALBUMIN/GLOB SERPL: 1 (ref 0.4–1.6)
ALP SERPL-CCNC: 91 U/L (ref 50–130)
ALT SERPL-CCNC: 68 U/L (ref 12–65)
AMPHET UR QL SCN: POSITIVE
ANION GAP SERPL CALC-SCNC: 3 MMOL/L (ref 2–11)
APPEARANCE UR: CLEAR
AST SERPL-CCNC: 52 U/L (ref 15–37)
BARBITURATES UR QL SCN: NEGATIVE
BASOPHILS # BLD: 0 K/UL (ref 0–0.2)
BASOPHILS NFR BLD: 1 % (ref 0–2)
BENZODIAZ UR QL: NEGATIVE
BILIRUB SERPL-MCNC: 0.4 MG/DL (ref 0.2–1.1)
BILIRUB UR QL: NEGATIVE
BUN SERPL-MCNC: 7 MG/DL (ref 6–23)
CALCIUM SERPL-MCNC: 9.6 MG/DL (ref 8.3–10.4)
CANNABINOIDS UR QL SCN: NEGATIVE
CHLORIDE SERPL-SCNC: 102 MMOL/L (ref 101–110)
CO2 SERPL-SCNC: 32 MMOL/L (ref 21–32)
COCAINE UR QL SCN: NEGATIVE
COLOR UR: NORMAL
CREAT SERPL-MCNC: 0.64 MG/DL (ref 0.6–1)
DIFFERENTIAL METHOD BLD: ABNORMAL
EOSINOPHIL # BLD: 0.1 K/UL (ref 0–0.8)
EOSINOPHIL NFR BLD: 2 % (ref 0.5–7.8)
ERYTHROCYTE [DISTWIDTH] IN BLOOD BY AUTOMATED COUNT: 13.5 % (ref 11.9–14.6)
ERYTHROCYTE [SEDIMENTATION RATE] IN BLOOD: 11 MM/HR (ref 0–20)
GLOBULIN SER CALC-MCNC: 4.2 G/DL (ref 2.8–4.5)
GLUCOSE SERPL-MCNC: 108 MG/DL (ref 65–100)
GLUCOSE UR STRIP.AUTO-MCNC: NEGATIVE MG/DL
HCG UR QL: NEGATIVE
HCT VFR BLD AUTO: 43 % (ref 35.8–46.3)
HGB BLD-MCNC: 13.8 G/DL (ref 11.7–15.4)
HGB UR QL STRIP: NEGATIVE
IMM GRANULOCYTES # BLD AUTO: 0 K/UL (ref 0–0.5)
IMM GRANULOCYTES NFR BLD AUTO: 0 % (ref 0–5)
KETONES UR QL STRIP.AUTO: NEGATIVE MG/DL
LACTATE SERPL-SCNC: 1.4 MMOL/L (ref 0.4–2)
LEUKOCYTE ESTERASE UR QL STRIP.AUTO: NEGATIVE
LYMPHOCYTES # BLD: 1.3 K/UL (ref 0.5–4.6)
LYMPHOCYTES NFR BLD: 23 % (ref 13–44)
MCH RBC QN AUTO: 28 PG (ref 26.1–32.9)
MCHC RBC AUTO-ENTMCNC: 32.1 G/DL (ref 31.4–35)
MCV RBC AUTO: 87.4 FL (ref 82–102)
METHADONE UR QL: NEGATIVE
MONOCYTES # BLD: 0.3 K/UL (ref 0.1–1.3)
MONOCYTES NFR BLD: 6 % (ref 4–12)
NEUTS SEG # BLD: 3.8 K/UL (ref 1.7–8.2)
NEUTS SEG NFR BLD: 68 % (ref 43–78)
NITRITE UR QL STRIP.AUTO: NEGATIVE
NRBC # BLD: 0 K/UL (ref 0–0.2)
OPIATES UR QL: NEGATIVE
PCP UR QL: NEGATIVE
PH UR STRIP: 7 (ref 5–9)
PLATELET # BLD AUTO: 131 K/UL (ref 150–450)
PMV BLD AUTO: 11.4 FL (ref 9.4–12.3)
POTASSIUM SERPL-SCNC: 3.9 MMOL/L (ref 3.5–5.1)
PROCALCITONIN SERPL-MCNC: 0.08 NG/ML (ref 0–0.49)
PROT SERPL-MCNC: 8.4 G/DL (ref 6.3–8.2)
PROT UR STRIP-MCNC: NEGATIVE MG/DL
RBC # BLD AUTO: 4.92 M/UL (ref 4.05–5.2)
SODIUM SERPL-SCNC: 137 MMOL/L (ref 133–143)
SP GR UR REFRACTOMETRY: 1.01 (ref 1–1.02)
UROBILINOGEN UR QL STRIP.AUTO: 0.2 EU/DL (ref 0.2–1)
WBC # BLD AUTO: 5.5 K/UL (ref 4.3–11.1)

## 2023-03-22 PROCEDURE — 87040 BLOOD CULTURE FOR BACTERIA: CPT

## 2023-03-22 PROCEDURE — 93005 ELECTROCARDIOGRAM TRACING: CPT

## 2023-03-22 PROCEDURE — 72146 MRI CHEST SPINE W/O DYE: CPT

## 2023-03-22 PROCEDURE — 80307 DRUG TEST PRSMV CHEM ANLYZR: CPT

## 2023-03-22 PROCEDURE — 6360000002 HC RX W HCPCS

## 2023-03-22 PROCEDURE — 83605 ASSAY OF LACTIC ACID: CPT

## 2023-03-22 PROCEDURE — 81025 URINE PREGNANCY TEST: CPT

## 2023-03-22 PROCEDURE — 96366 THER/PROPH/DIAG IV INF ADDON: CPT

## 2023-03-22 PROCEDURE — 80053 COMPREHEN METABOLIC PANEL: CPT

## 2023-03-22 PROCEDURE — 81003 URINALYSIS AUTO W/O SCOPE: CPT

## 2023-03-22 PROCEDURE — 96365 THER/PROPH/DIAG IV INF INIT: CPT

## 2023-03-22 PROCEDURE — 96374 THER/PROPH/DIAG INJ IV PUSH: CPT

## 2023-03-22 PROCEDURE — 99285 EMERGENCY DEPT VISIT HI MDM: CPT

## 2023-03-22 PROCEDURE — 85652 RBC SED RATE AUTOMATED: CPT

## 2023-03-22 PROCEDURE — 6370000000 HC RX 637 (ALT 250 FOR IP)

## 2023-03-22 PROCEDURE — 85025 COMPLETE CBC W/AUTO DIFF WBC: CPT

## 2023-03-22 PROCEDURE — 84145 PROCALCITONIN (PCT): CPT

## 2023-03-22 PROCEDURE — 72141 MRI NECK SPINE W/O DYE: CPT

## 2023-03-22 PROCEDURE — 2580000003 HC RX 258

## 2023-03-22 PROCEDURE — 96375 TX/PRO/DX INJ NEW DRUG ADDON: CPT

## 2023-03-22 RX ORDER — HALOPERIDOL 5 MG/ML
5 INJECTION INTRAMUSCULAR ONCE
Status: COMPLETED | OUTPATIENT
Start: 2023-03-22 | End: 2023-03-22

## 2023-03-22 RX ORDER — HALOPERIDOL 5 MG/ML
5 INJECTION INTRAMUSCULAR
Status: DISCONTINUED | OUTPATIENT
Start: 2023-03-22 | End: 2023-03-22

## 2023-03-22 RX ORDER — 0.9 % SODIUM CHLORIDE 0.9 %
1000 INTRAVENOUS SOLUTION INTRAVENOUS ONCE
Status: COMPLETED | OUTPATIENT
Start: 2023-03-22 | End: 2023-03-22

## 2023-03-22 RX ORDER — DIAZEPAM 5 MG/1
5 TABLET ORAL ONCE
Status: COMPLETED | OUTPATIENT
Start: 2023-03-22 | End: 2023-03-22

## 2023-03-22 RX ORDER — DIPHENHYDRAMINE HYDROCHLORIDE 50 MG/ML
25 INJECTION INTRAMUSCULAR; INTRAVENOUS
Status: DISCONTINUED | OUTPATIENT
Start: 2023-03-22 | End: 2023-03-22

## 2023-03-22 RX ORDER — DROPERIDOL 2.5 MG/ML
1.25 INJECTION, SOLUTION INTRAMUSCULAR; INTRAVENOUS EVERY 6 HOURS PRN
Status: DISCONTINUED | OUTPATIENT
Start: 2023-03-22 | End: 2023-03-22

## 2023-03-22 RX ORDER — HALOPERIDOL 5 MG/ML
INJECTION INTRAMUSCULAR
Status: DISCONTINUED
Start: 2023-03-22 | End: 2023-03-23 | Stop reason: HOSPADM

## 2023-03-22 RX ORDER — DIPHENHYDRAMINE HYDROCHLORIDE 50 MG/ML
INJECTION INTRAMUSCULAR; INTRAVENOUS
Status: DISCONTINUED
Start: 2023-03-22 | End: 2023-03-22

## 2023-03-22 RX ORDER — DIPHENHYDRAMINE HYDROCHLORIDE 50 MG/ML
12.5 INJECTION INTRAMUSCULAR; INTRAVENOUS
Status: COMPLETED | OUTPATIENT
Start: 2023-03-22 | End: 2023-03-22

## 2023-03-22 RX ADMIN — DIAZEPAM 5 MG: 5 TABLET ORAL at 16:57

## 2023-03-22 RX ADMIN — DIPHENHYDRAMINE HYDROCHLORIDE 12.5 MG: 50 INJECTION, SOLUTION INTRAMUSCULAR; INTRAVENOUS at 18:08

## 2023-03-22 RX ADMIN — HALOPERIDOL LACTATE 5 MG: 5 INJECTION, SOLUTION INTRAMUSCULAR at 19:04

## 2023-03-22 RX ADMIN — SODIUM CHLORIDE 1000 ML: 900 INJECTION, SOLUTION INTRAVENOUS at 20:01

## 2023-03-22 RX ADMIN — VANCOMYCIN HYDROCHLORIDE 1250 MG: 10 INJECTION, POWDER, LYOPHILIZED, FOR SOLUTION INTRAVENOUS at 19:59

## 2023-03-22 ASSESSMENT — ENCOUNTER SYMPTOMS
COLOR CHANGE: 0
VOMITING: 0
ABDOMINAL PAIN: 0
SHORTNESS OF BREATH: 0
NAUSEA: 0
DIARRHEA: 0

## 2023-03-22 NOTE — ED TRIAGE NOTES
Patient ambulatory to triage. Patient c\o R sided neck pain that began a few weeks ago. Patient denies any injury or trauma. Patient states she had an infection in her spinal cord previous.

## 2023-03-22 NOTE — ED PROVIDER NOTES
Has not finished IV antibiotics. Has left the hospital AMA both at [facility] and our downtown ER. The history is provided by the patient. No  was used. Review of Systems   Constitutional:  Negative for chills, fatigue and fever. Respiratory:  Negative for shortness of breath. Cardiovascular:  Negative for chest pain and palpitations. Gastrointestinal:  Negative for abdominal pain, diarrhea, nausea and vomiting. Musculoskeletal:  Positive for neck pain. Skin:  Negative for color change. Patient states she has some sores on the back part of her left upper thigh that she noticed just today   Neurological:  Negative for weakness and headaches. All other systems reviewed and are negative. Vitals signs and nursing note reviewed:  Patient Vitals for the past 4 hrs:   Pulse Resp BP SpO2   03/22/23 1906 93 14 -- 99 %   03/22/23 1900 93 15 107/73 99 %          Physical Exam  Vitals and nursing note reviewed. Constitutional:       General: She is not in acute distress. Appearance: Normal appearance. She is not ill-appearing, toxic-appearing or diaphoretic. HENT:      Head: Atraumatic. Right Ear: Tympanic membrane, ear canal and external ear normal.      Left Ear: Tympanic membrane, ear canal and external ear normal.      Nose: Nose normal.      Mouth/Throat:      Mouth: Mucous membranes are moist.      Pharynx: Oropharynx is clear. Eyes:      Extraocular Movements: Extraocular movements intact. Conjunctiva/sclera: Conjunctivae normal.   Cardiovascular:      Rate and Rhythm: Normal rate. Pulses: Normal pulses. Heart sounds: Normal heart sounds. Pulmonary:      Effort: Pulmonary effort is normal.      Breath sounds: Normal breath sounds. Abdominal:      Tenderness: There is no abdominal tenderness. Musculoskeletal:         General: Normal range of motion. Cervical back: Normal range of motion and neck supple.  Tenderness (Right lateral of

## 2023-03-22 NOTE — PROGRESS NOTES
Please complete and sign MRI screening form. If patient is claustrophobic and requires medication for MRI please have available.

## 2023-03-23 ENCOUNTER — HOSPITAL ENCOUNTER (INPATIENT)
Age: 43
LOS: 2 days | Discharge: HOME | DRG: 541 | End: 2023-03-25
Attending: INTERNAL MEDICINE | Admitting: FAMILY MEDICINE
Payer: COMMERCIAL

## 2023-03-23 PROBLEM — F19.10 DRUG ABUSE (HCC): Status: ACTIVE | Noted: 2023-03-23

## 2023-03-23 LAB
ANION GAP SERPL CALC-SCNC: 3 MMOL/L (ref 2–11)
BUN SERPL-MCNC: 4 MG/DL (ref 6–23)
CALCIUM SERPL-MCNC: 8.7 MG/DL (ref 8.3–10.4)
CHLORIDE SERPL-SCNC: 107 MMOL/L (ref 101–110)
CO2 SERPL-SCNC: 29 MMOL/L (ref 21–32)
CREAT SERPL-MCNC: 0.5 MG/DL (ref 0.6–1)
CRP SERPL-MCNC: 0.5 MG/DL (ref 0–0.9)
EKG ATRIAL RATE: 83 BPM
EKG DIAGNOSIS: NORMAL
EKG P AXIS: 63 DEGREES
EKG P-R INTERVAL: 135 MS
EKG Q-T INTERVAL: 380 MS
EKG QRS DURATION: 79 MS
EKG QTC CALCULATION (BAZETT): 447 MS
EKG R AXIS: 78 DEGREES
EKG T AXIS: 72 DEGREES
EKG VENTRICULAR RATE: 83 BPM
GLUCOSE SERPL-MCNC: 137 MG/DL (ref 65–100)
HAV IGM SER QL: NONREACTIVE
HBV CORE IGM SER QL: NONREACTIVE
HBV SURFACE AG SER QL: NONREACTIVE
HCV AB SER QL: REACTIVE
HIV 1+2 AB+HIV1 P24 AG SERPL QL IA: NONREACTIVE
HIV 1/2 RESULT COMMENT: NORMAL
POTASSIUM SERPL-SCNC: 3.7 MMOL/L (ref 3.5–5.1)
SODIUM SERPL-SCNC: 139 MMOL/L (ref 133–143)

## 2023-03-23 PROCEDURE — 87389 HIV-1 AG W/HIV-1&-2 AB AG IA: CPT

## 2023-03-23 PROCEDURE — 6360000002 HC RX W HCPCS: Performed by: FAMILY MEDICINE

## 2023-03-23 PROCEDURE — 36415 COLL VENOUS BLD VENIPUNCTURE: CPT

## 2023-03-23 PROCEDURE — 80074 ACUTE HEPATITIS PANEL: CPT

## 2023-03-23 PROCEDURE — 2580000003 HC RX 258: Performed by: INTERNAL MEDICINE

## 2023-03-23 PROCEDURE — 2580000003 HC RX 258: Performed by: FAMILY MEDICINE

## 2023-03-23 PROCEDURE — 1100000000 HC RM PRIVATE

## 2023-03-23 PROCEDURE — 6370000000 HC RX 637 (ALT 250 FOR IP): Performed by: FAMILY MEDICINE

## 2023-03-23 PROCEDURE — 86140 C-REACTIVE PROTEIN: CPT

## 2023-03-23 PROCEDURE — 80048 BASIC METABOLIC PNL TOTAL CA: CPT

## 2023-03-23 RX ORDER — ACETAMINOPHEN 325 MG/1
650 TABLET ORAL EVERY 6 HOURS PRN
Status: DISCONTINUED | OUTPATIENT
Start: 2023-03-23 | End: 2023-03-25 | Stop reason: HOSPADM

## 2023-03-23 RX ORDER — ONDANSETRON 2 MG/ML
4 INJECTION INTRAMUSCULAR; INTRAVENOUS EVERY 6 HOURS PRN
Status: DISCONTINUED | OUTPATIENT
Start: 2023-03-23 | End: 2023-03-25 | Stop reason: HOSPADM

## 2023-03-23 RX ORDER — ACETAMINOPHEN 650 MG/1
650 SUPPOSITORY RECTAL EVERY 6 HOURS PRN
Status: DISCONTINUED | OUTPATIENT
Start: 2023-03-23 | End: 2023-03-25 | Stop reason: HOSPADM

## 2023-03-23 RX ORDER — ONDANSETRON 4 MG/1
4 TABLET, ORALLY DISINTEGRATING ORAL EVERY 8 HOURS PRN
Status: DISCONTINUED | OUTPATIENT
Start: 2023-03-23 | End: 2023-03-25 | Stop reason: HOSPADM

## 2023-03-23 RX ORDER — SODIUM CHLORIDE 0.9 % (FLUSH) 0.9 %
5-40 SYRINGE (ML) INJECTION PRN
Status: DISCONTINUED | OUTPATIENT
Start: 2023-03-23 | End: 2023-03-25 | Stop reason: HOSPADM

## 2023-03-23 RX ORDER — MORPHINE SULFATE 2 MG/ML
2 INJECTION, SOLUTION INTRAMUSCULAR; INTRAVENOUS EVERY 4 HOURS PRN
Status: DISCONTINUED | OUTPATIENT
Start: 2023-03-23 | End: 2023-03-24

## 2023-03-23 RX ORDER — HYDROCODONE BITARTRATE AND ACETAMINOPHEN 5; 325 MG/1; MG/1
1 TABLET ORAL EVERY 4 HOURS PRN
Status: DISCONTINUED | OUTPATIENT
Start: 2023-03-23 | End: 2023-03-24

## 2023-03-23 RX ORDER — NICOTINE 21 MG/24HR
1 PATCH, TRANSDERMAL 24 HOURS TRANSDERMAL DAILY
Status: DISCONTINUED | OUTPATIENT
Start: 2023-03-23 | End: 2023-03-25 | Stop reason: HOSPADM

## 2023-03-23 RX ORDER — SODIUM CHLORIDE, SODIUM LACTATE, POTASSIUM CHLORIDE, CALCIUM CHLORIDE 600; 310; 30; 20 MG/100ML; MG/100ML; MG/100ML; MG/100ML
INJECTION, SOLUTION INTRAVENOUS CONTINUOUS
Status: DISCONTINUED | OUTPATIENT
Start: 2023-03-23 | End: 2023-03-24

## 2023-03-23 RX ORDER — POLYETHYLENE GLYCOL 3350 17 G/17G
17 POWDER, FOR SOLUTION ORAL DAILY PRN
Status: DISCONTINUED | OUTPATIENT
Start: 2023-03-23 | End: 2023-03-25 | Stop reason: HOSPADM

## 2023-03-23 RX ORDER — SODIUM CHLORIDE 0.9 % (FLUSH) 0.9 %
5-40 SYRINGE (ML) INJECTION EVERY 12 HOURS SCHEDULED
Status: DISCONTINUED | OUTPATIENT
Start: 2023-03-23 | End: 2023-03-25 | Stop reason: HOSPADM

## 2023-03-23 RX ORDER — ENOXAPARIN SODIUM 100 MG/ML
40 INJECTION SUBCUTANEOUS DAILY
Status: DISCONTINUED | OUTPATIENT
Start: 2023-03-23 | End: 2023-03-25 | Stop reason: HOSPADM

## 2023-03-23 RX ORDER — SODIUM CHLORIDE 9 MG/ML
INJECTION, SOLUTION INTRAVENOUS PRN
Status: DISCONTINUED | OUTPATIENT
Start: 2023-03-23 | End: 2023-03-25 | Stop reason: HOSPADM

## 2023-03-23 RX ADMIN — SODIUM CHLORIDE, POTASSIUM CHLORIDE, SODIUM LACTATE AND CALCIUM CHLORIDE: 600; 310; 30; 20 INJECTION, SOLUTION INTRAVENOUS at 01:59

## 2023-03-23 RX ADMIN — ENOXAPARIN SODIUM 40 MG: 100 INJECTION SUBCUTANEOUS at 09:55

## 2023-03-23 RX ADMIN — MORPHINE SULFATE 2 MG: 2 INJECTION, SOLUTION INTRAMUSCULAR; INTRAVENOUS at 19:06

## 2023-03-23 RX ADMIN — MORPHINE SULFATE 2 MG: 2 INJECTION, SOLUTION INTRAMUSCULAR; INTRAVENOUS at 14:33

## 2023-03-23 RX ADMIN — VANCOMYCIN HYDROCHLORIDE 1000 MG: 1 INJECTION, POWDER, LYOPHILIZED, FOR SOLUTION INTRAVENOUS at 07:59

## 2023-03-23 RX ADMIN — MORPHINE SULFATE 2 MG: 2 INJECTION, SOLUTION INTRAMUSCULAR; INTRAVENOUS at 02:10

## 2023-03-23 RX ADMIN — MORPHINE SULFATE 2 MG: 2 INJECTION, SOLUTION INTRAMUSCULAR; INTRAVENOUS at 10:10

## 2023-03-23 RX ADMIN — HYDROCODONE BITARTRATE AND ACETAMINOPHEN 1 TABLET: 5; 325 TABLET ORAL at 22:43

## 2023-03-23 RX ADMIN — SODIUM CHLORIDE, POTASSIUM CHLORIDE, SODIUM LACTATE AND CALCIUM CHLORIDE: 600; 310; 30; 20 INJECTION, SOLUTION INTRAVENOUS at 19:05

## 2023-03-23 RX ADMIN — SODIUM CHLORIDE, PRESERVATIVE FREE 10 ML: 5 INJECTION INTRAVENOUS at 20:47

## 2023-03-23 ASSESSMENT — PAIN DESCRIPTION - ONSET
ONSET: PROGRESSIVE
ONSET: PROGRESSIVE

## 2023-03-23 ASSESSMENT — PAIN - FUNCTIONAL ASSESSMENT
PAIN_FUNCTIONAL_ASSESSMENT: ACTIVITIES ARE NOT PREVENTED

## 2023-03-23 ASSESSMENT — PAIN DESCRIPTION - ORIENTATION
ORIENTATION: UPPER
ORIENTATION: MID

## 2023-03-23 ASSESSMENT — PAIN DESCRIPTION - FREQUENCY
FREQUENCY: INTERMITTENT
FREQUENCY: INTERMITTENT

## 2023-03-23 ASSESSMENT — PAIN SCALES - GENERAL
PAINLEVEL_OUTOF10: 8
PAINLEVEL_OUTOF10: 0
PAINLEVEL_OUTOF10: 0
PAINLEVEL_OUTOF10: 10
PAINLEVEL_OUTOF10: 8
PAINLEVEL_OUTOF10: 0
PAINLEVEL_OUTOF10: 8
PAINLEVEL_OUTOF10: 3
PAINLEVEL_OUTOF10: 8
PAINLEVEL_OUTOF10: 0

## 2023-03-23 ASSESSMENT — PAIN DESCRIPTION - PAIN TYPE
TYPE: ACUTE PAIN
TYPE: CHRONIC PAIN;ACUTE PAIN

## 2023-03-23 ASSESSMENT — PAIN DESCRIPTION - DESCRIPTORS
DESCRIPTORS: CRUSHING
DESCRIPTORS: ACHING

## 2023-03-23 ASSESSMENT — PAIN DESCRIPTION - LOCATION
LOCATION: BACK
LOCATION: NECK
LOCATION: BACK;NECK
LOCATION: BACK
LOCATION: NECK
LOCATION: NECK

## 2023-03-23 NOTE — PROGRESS NOTES
Comprehensive Nutrition Assessment    Type and Reason for Visit:    Malnutrition Screening Tool: Malnutrition Screen  Have you recently lost weight without trying?: 2 to 13 pounds (1 point)  Have you been eating poorly because of a decreased appetite?: Yes (1 point)  Malnutrition Screening Tool Score: 2    Nutrition Recommendations/Plan:   Meals and Snacks:  Diet: Continue current order  Nutrition Supplement Therapy:  Medical food supplement therapy:  Initiate Ensure Enlive twice per day (this provides 350 kcal and 20 grams protein per bottle)     Malnutrition Assessment:  Malnutrition Status: Insufficient data  NFPE deferred d/t pt sleeping    Nutrition Assessment:  Nutrition History: Unable to obtain d/t pt sleeping. Limited wt hx per EMR, but pt had previous wt on 1/10/23 of 125 lbs, which is consistent with current weight of 121 lbs. Do You Have Any Cultural, Roman Catholic, or Ethnic Food Preferences?: No   Nutrition Background:       PMH significant for illicit drug use and cervical spine osteomyelitis. Pt presents this admission for neck pain. Noted to have previously left both [address] [place] [address] and [facility] after cervical osteomyelitis diagnosis established. Nutrition Interval:  RD attempted to meet with pt, however pt sleeping at time of visit and not easily aroused. RD will defer physical exam and nutrition hx at this time. Nursing charted pt ate % of her lunch today. RD will start ensure enlive BID at this time and f/u on po intake at reassessment. Current Nutrition Therapies:  ADULT DIET; Regular    Current Intake:   Average Meal Intake: % (per nurse charting, 100% of lunch today) Average Supplements Intake: None Ordered      Anthropometric Measures:  Height: 5' 7.5\" (171.5 cm)  Current Body Wt: 121 lb 7.6 oz (55.1 kg) (3/23), Weight source: Standing Scale  BMI: 18.7, Normal Weight (BMI 18.5-24. 9)  Admission Body Weight: 121 lb 7.6 oz (55.1 kg)  Ideal Body Weight (Kg) (Calculated): 63 kg (138 lbs), 88 %  Usual Body Wt:  , Percent weight change:         BMI Category Normal Weight (BMI 18.5-24. 9)    Estimated Daily Nutrient Needs:  Energy (kcal/day): 1883-9413 (25-30) (Kcal/kg Weight used: 55.1 kg Current  Protein (g/day): 66-83 (1.2-1.5 g/kg) Weight Used: (Current) 55.1 kg  Fluid (ml/day):   (1 ml/kcal)    Nutrition Diagnosis:   No nutrition diagnosis at this time     Nutrition Interventions:   Food and/or Nutrient Delivery: Continue Current Diet, Start Oral Nutrition Supplement     Coordination of Nutrition Care: Continue to monitor while inpatient       Goals:       Active Goal: Meet at least 75% of estimated needs, by next RD assessment       Nutrition Monitoring and Evaluation:      Food/Nutrient Intake Outcomes: Food and Nutrient Intake, Supplement Intake  Physical Signs/Symptoms Outcomes: Weight, Meal Time Behavior    Discharge Planning:    Continue current diet    Alberto Garcia RD

## 2023-03-23 NOTE — PROGRESS NOTES
Patient seen and examined. Ms. Gi Helton is a 37 yo WF with pmh of illicit drug use who presented earlier this morning for worsening neck pain. She has a pmh of osteomyelitis of cervical spine but in past has left AMA prior to completion of antibiotic therapy. Imaging/MRI reveals:      Complete loss of the C2-C3 disc and collapse of the superior half of the C3    vertebral body, with 50% loss of height, no retropulsion     She is currently IV vancomycin and ID has been consulted. She has ROM of neck and no focal deficits of extremities but neck is uncomfortable. -decrease LR to 75/hr  -consult orthospine for MRI changes noted above.      Jassi Brown MD  Hospitalist

## 2023-03-23 NOTE — ED NOTES
TRANSFER - OUT REPORT:    Verbal report given to [facility] on Minna Rogers  being transferred to [facility] [phone] for routine progression of patient care       Report consisted of patient's Situation, Background, Assessment and   Recommendations(SBAR). Information from the following report(s) Nurse Handoff Report was reviewed with the receiving nurse. [place] Assessment: Presents to emergency department  because of falls (Syncope, seizure, or loss of consciousness): No, Age > 79: No, Altered Mental Status, Intoxication with alcohol or substance confusion (Disorientation, impaired judgment, poor safety awaremess, or inability to follow instructions): No, Impaired Mobility: Ambulates or transfers with assistive devices or assistance; Unable to ambulate or transer.: No, Nursing Judgement: No  Lines:   Peripheral IV 03/22/23 Left Antecubital (Active)       Peripheral IV 03/22/23 Right Antecubital (Active)        Opportunity for questions and clarification was provided.       Patient transported with:  Monitor patient is being transfered by ambulance            Jessica Hawk RN  03/23/23 7438

## 2023-03-23 NOTE — CONSULTS
Infectious Disease Consult    Today's Date: 3/23/2023   Admit Date: 3/23/2023    Impression:   Cervical spine osteomyelitis   MRI 03/23/23: Complete loss of the C2-C3 disc and collapse of central portion of the C3 vertebral body. 20% retrolisthesis of C2 on C3 with impaction of the anterior C2 inferior endplate into the central body - findings presumably the result of chronic discitis-osteomyelitis. Small amount of edema present in the marrow of the inferior portion of the C2 and throughout the C3 vertebrae. MRI 01/11/2023: findings of C2-C3 osteomyelitis at [facility], she left AMA without finishing abx  Hepatitis C Ab reactive 01/10/23, per patient she has not been treated for Hep C prior   IV drug abuse, urine drug screen + for amphetamines    Plan:   Blood cx 03/23/23 in process  Will screen for HIV and hepatitis panel   Will stop antibiotics at this time since she shows no signs of sepsis/active infection. She is afebrile, WBC WNL  Need to obtain biopsy OFF antibiotics for culture guided treatment of cervical osteomyelitis   Recommend consult to ortho spine so they can assist with biopsy  Will check CRP  ID will continue to follow      Anti-infectives:   Vancomycin 03/22 - 03/23    Subjective:   Date of Consultation:  March 23, 2023  Referring Physician: Hospitalist     Patient is a 38 y.o. female with PMH of IVDU and cervical spine osteomyelitis. She presents to the ED 03/22/23 c/o of neck pain. She denies any known fevers. In ER WBC WNL, no significant lab abnormalities. MRI of c-spine and t-spine obtained with findings of chronic discitis-osteomyelitis. She has left AMA from [facility] and [facility] over the past 2 months without finishing recommended course of antibiotics for cervical osteomyelitis. Blood cx sent and vancomycin started. ID consulted for cervical osteomyelitis and abx recs. Today she is afebrile. HIV negative as of 01/10/2023.  Hep C Ab reactive on 01/10/2023, per patient she was not aware of this except as stated in the HPI. Objective:     Visit Vitals  BP 99/65   Pulse 92   Temp 98.6 °F (37 °C)   Resp 18   Ht 5' 7.5\" (1.715 m)   Wt 121 lb 6.4 oz (55.1 kg)   SpO2 99%   BMI 18.73 kg/m²     Temp (24hrs), Av °F (36.7 °C), Min:97.6 °F (36.4 °C), Max:98.6 °F (37 °C)       Lines:  Peripheral IV:       Physical Exam:    General:  Alert, cooperative   Eyes:  Sclera anicteric. Pupils equally round and reactive to light. Lungs:   Clear to auscultation bilaterally, good effort   CV:  Regular rate and rhythm,no murmur, click, rub or gallop   Abdomen:   Soft, non-tender.  bowel sounds normal. non-distended   Extremities: No cyanosis or edema   Skin: Skin color, texture, turgor normal. no acute rash or lesions       Musculoskeletal: Pain with active range of motion in cervical spine, she is able to move all extremities with no weakness noted    Lines/Devices:  Intact, no erythema, drainage or tenderness   Psych: Alert and oriented, normal mood affect given the setting       Data Review:     CBC:  Recent Labs     23  1140   WBC 5.5   HGB 13.8   HCT 43.0   *       BMP:  Recent Labs     23  1140 23  0538   BUN 7 4*    139   K 3.9 3.7    107   CO2 32 29       LFTS:  Recent Labs     23  1140   ALT 68*       Microbiology:   Reviewed    Imaging:   Reviewed    Signed By: MANUEL Corbett     2023

## 2023-03-23 NOTE — ASSESSMENT & PLAN NOTE
- Concerning MRI findings  - Will start IV vancomycin  - Consult with Infectious Disease  - May need consultation with Ortho Spine as well  - Follow blood cultures

## 2023-03-23 NOTE — ED NOTES
Pt medicated as ordered. Provided with warm blanket. Resting on stretcher, eyes closed.       Dre Cannon RN  03/22/23 2008

## 2023-03-23 NOTE — PROGRESS NOTES
[facility] Pharmacokinetic Monitoring Service - Vancomycin    Consulting Provider: Usama Gabriel   Indication: cervical spine osteomyelitis  Target Concentration: Goal AUC/NICOLE 400-600 mg*hr/L  Day of Therapy: 1  Additional Antimicrobials: none    Patient eligible for piperacillin-tazobactam to cefepime auto-substitution per P&T approved protocol? N/A    Pertinent Laboratory Values: Wt Readings from Last 1 Encounters:   03/23/23 121 lb 6.4 oz (55.1 kg)     Temp Readings from Last 1 Encounters:   03/23/23 98 °F (36.7 °C) (Oral)     Recent Labs     03/22/23  1140   BUN 7   CREATININE 0.64   WBC 5.5   PROCAL 0.08     Estimated Creatinine Clearance: 102 mL/min (based on SCr of 0.64 mg/dL). MRSA Nasal Swab: N/A.  Non-respiratory infection    Assessment:  Date/Time Dose Concentration AUC         Note: Serum concentrations collected for AUC dosing may appear elevated if collected in close proximity to the dose administered, this is not necessarily an indication of toxicity    Plan:  Dosing recommendations based on Bayesian software  Start vancomycin 1250 mg x1, then 1000 mg q 12 hours  Anticipated AUC of 504 and trough concentration of 14.5 at steady state  Renal labs as indicated   Vancomycin concentrations will be ordered as clinically appropriate   Pharmacy will continue to monitor patient and adjust therapy as indicated    Thank you for the consult,  Davy Aguilar, PharmD, BCPS  Clinical Pharmacist

## 2023-03-23 NOTE — H&P
Skin:     No rashes. Normal turgor. Normal coloration  Neuro:  Cranial nerves II-XII grossly intact. Sensation intact  Psych:  Normal mood and affect.   Alert and oriented x3    Data Ordered and Personally Reviewed:    Last 24hr Labs:  Recent Results (from the past 24 hour(s))   CBC with Auto Differential    Collection Time: 03/22/23 11:40 AM   Result Value Ref Range    WBC 5.5 4.3 - 11.1 K/uL    RBC 4.92 4.05 - 5.2 M/uL    Hemoglobin 13.8 11.7 - 15.4 g/dL    Hematocrit 43.0 35.8 - 46.3 %    MCV 87.4 82.0 - 102.0 FL    MCH 28.0 26.1 - 32.9 PG    MCHC 32.1 31.4 - 35.0 g/dL    RDW 13.5 11.9 - 14.6 %    Platelets 340 (L) 591 - 450 K/uL    MPV 11.4 9.4 - 12.3 FL    nRBC 0.00 0.0 - 0.2 K/uL    Differential Type AUTOMATED      Seg Neutrophils 68 43 - 78 %    Lymphocytes 23 13 - 44 %    Monocytes 6 4.0 - 12.0 %    Eosinophils % 2 0.5 - 7.8 %    Basophils 1 0.0 - 2.0 %    Immature Granulocytes 0 0.0 - 5.0 %    Segs Absolute 3.8 1.7 - 8.2 K/UL    Absolute Lymph # 1.3 0.5 - 4.6 K/UL    Absolute Mono # 0.3 0.1 - 1.3 K/UL    Absolute Eos # 0.1 0.0 - 0.8 K/UL    Basophils Absolute 0.0 0.0 - 0.2 K/UL    Absolute Immature Granulocyte 0.0 0.0 - 0.5 K/UL   Comprehensive Metabolic Panel    Collection Time: 03/22/23 11:40 AM   Result Value Ref Range    Sodium 137 133 - 143 mmol/L    Potassium 3.9 3.5 - 5.1 mmol/L    Chloride 102 101 - 110 mmol/L    CO2 32 21 - 32 mmol/L    Anion Gap 3 2 - 11 mmol/L    Glucose 108 (H) 65 - 100 mg/dL    BUN 7 6 - 23 MG/DL    Creatinine 0.64 0.6 - 1.0 MG/DL    Est, Glom Filt Rate >60 >60 ml/min/1.73m2    Calcium 9.6 8.3 - 10.4 MG/DL    Total Bilirubin 0.4 0.2 - 1.1 MG/DL    ALT 68 (H) 12 - 65 U/L    AST 52 (H) 15 - 37 U/L    Alk Phosphatase 91 50 - 130 U/L    Total Protein 8.4 (H) 6.3 - 8.2 g/dL    Albumin 4.2 3.5 - 5.0 g/dL    Globulin 4.2 2.8 - 4.5 g/dL    Albumin/Globulin Ratio 1.0 0.4 - 1.6     Sedimentation Rate    Collection Time: 03/22/23 11:40 AM   Result Value Ref Range    Sed Rate, Automated 11 0 - 20 mm/hr   Procalcitonin    Collection Time: 03/22/23 11:40 AM   Result Value Ref Range    Procalcitonin 0.08 0.00 - 0.49 ng/mL   Lactate, Sepsis    Collection Time: 03/22/23 11:40 AM   Result Value Ref Range    Lactic Acid, Sepsis 1.4 0.4 - 2.0 MMOL/L   Urinalysis w rflx microscopic    Collection Time: 03/22/23 11:49 AM   Result Value Ref Range    Color, UA YELLOW/STRAW      Appearance CLEAR      Specific Gravity, UA 1.008 1.001 - 1.023      pH, Urine 7.0 5.0 - 9.0      Protein, UA Negative NEG mg/dL    Glucose, UA Negative mg/dL    Ketones, Urine Negative NEG mg/dL    Bilirubin Urine Negative NEG      Blood, Urine Negative NEG      Urobilinogen, Urine 0.2 0.2 - 1.0 EU/dL    Nitrite, Urine Negative NEG      Leukocyte Esterase, Urine Negative NEG     Urine Drug Screen    Collection Time: 03/22/23 11:49 AM   Result Value Ref Range    PCP, Urine Negative      Benzodiazepines, Urine Negative      Cocaine, Urine Negative      Amphetamine, Urine Positive      Methadone, Urine Negative      THC, TH-Cannabinol, Urine Negative      Opiates, Urine Negative      Barbiturates, Urine Negative     POC Pregnancy Urine Qual    Collection Time: 03/22/23 11:50 AM   Result Value Ref Range    Preg Test, Ur Negative NEG     EKG 12 Lead    Collection Time: 03/22/23  6:56 PM   Result Value Ref Range    Ventricular Rate 83 BPM    Atrial Rate 83 BPM    P-R Interval 135 ms    QRS Duration 79 ms    Q-T Interval 380 ms    QTc Calculation (Bazett) 447 ms    P Axis 63 degrees    R Axis 78 degrees    T Axis 72 degrees    Diagnosis Sinus rhythm        Signed:  Florecita Rachel MD

## 2023-03-23 NOTE — PROGRESS NOTES
NEUROSURGERY PLAN OF CARE NOTE:     Chart and Imaging Reviewed:     Alejandro Randolph 38 y.o. female presented to [facility] following with worsening neck pain. She was being treated for osteomyelitis discitis at [facility] and has an MRI from 1/11/2023 that I cannot review today but per report had evidence of osteomyelitis discitis but did not have any evidence of a focal epidural abscess. Be nice to have the MRI from 1/11/2023 pushed from [facility] to our PACS system for further review. I have independently reviewed her MRI cervical spine and contrast was not able to be obtained this was performed on 3/22/2023 which is limited somewhat by motion degradation that demonstrates disc height collapse at C2-3 and a superior endplate compression deformity of the C3 vertebra with approximately 40 to 50% height loss. There is no evidence of significant STIR signal concerning for ligamentous injury or instability at this time. Unfortunately this is a noncontrasted study. There is no significant spinal stenosis or cord compression the spinal cord is of normal caliber intensity throughout. These findings seem consistent with a history of incompletely treated osteomyelitis discitis. We will discuss with radiology to have [address] [place] [address] prior MRIs through PACS. At this time no acute neurosurgical intervention is necessary. Would recommend placing patient in an Aspen rigid cervical collar. Full consult note to follow in a.m. I spent a total of 5-10 minutes of medical consultative discussion and review. Arthur Ailcia.  Lorenza Ordonez MD, [address] [facility]

## 2023-03-23 NOTE — PLAN OF CARE
Patient has remained A&O x 4, but has slept most of the day. Patient educated on new medication lovenox and vanc this AM.     -morphine x2    Vital signs have remained stable. No signs or symptoms of distress noted. Patient rounded on hourly by myself or patient assistant and encouraged to call with any needs. No signs of distress noted. Bed low, locked, call feliz within reach.    Ana Rachel RN    Problem: Discharge Planning  Goal: Discharge to home or other facility with appropriate resources  Outcome: Progressing     Problem: Pain  Goal: Verbalizes/displays adequate comfort level or baseline comfort level  Outcome: Progressing     Problem: Safety - Adult  Goal: Free from fall injury  Outcome: Progressing     Problem: Neurosensory - Adult  Goal: Achieves stable or improved neurological status  Outcome: Progressing  Goal: Achieves maximal functionality and self care  Outcome: Progressing     Problem: Respiratory - Adult  Goal: Achieves optimal ventilation and oxygenation  Outcome: Progressing     Problem: Cardiovascular - Adult  Goal: Maintains optimal cardiac output and hemodynamic stability  Outcome: Progressing  Goal: Absence of cardiac dysrhythmias or at baseline  Outcome: Progressing     Problem: Skin/Tissue Integrity - Adult  Goal: Skin integrity remains intact  Outcome: Progressing  Goal: Incisions, wounds, or drain sites healing without S/S of infection  Outcome: Progressing  Goal: Oral mucous membranes remain intact  Outcome: Progressing     Problem: Musculoskeletal - Adult  Goal: Return mobility to safest level of function  Outcome: Progressing  Goal: Maintain proper alignment of affected body part  Outcome: Progressing  Goal: Return ADL status to a safe level of function  Outcome: Progressing     Problem: Gastrointestinal - Adult  Goal: Minimal or absence of nausea and vomiting  Outcome: Progressing  Goal: Maintains or returns to baseline bowel function  Outcome: Progressing  Goal: Maintains adequate

## 2023-03-24 LAB
ANION GAP SERPL CALC-SCNC: 1 MMOL/L (ref 2–11)
BASOPHILS # BLD: 0 K/UL (ref 0–0.2)
BASOPHILS NFR BLD: 1 % (ref 0–2)
BUN SERPL-MCNC: 5 MG/DL (ref 6–23)
CALCIUM SERPL-MCNC: 8.6 MG/DL (ref 8.3–10.4)
CHLORIDE SERPL-SCNC: 106 MMOL/L (ref 101–110)
CO2 SERPL-SCNC: 30 MMOL/L (ref 21–32)
CREAT SERPL-MCNC: 0.6 MG/DL (ref 0.6–1)
DIFFERENTIAL METHOD BLD: ABNORMAL
EKG ATRIAL RATE: 123 BPM
EKG DIAGNOSIS: NORMAL
EKG P AXIS: 60 DEGREES
EKG P-R INTERVAL: 138 MS
EKG Q-T INTERVAL: 326 MS
EKG QRS DURATION: 76 MS
EKG QTC CALCULATION (BAZETT): 466 MS
EKG R AXIS: 76 DEGREES
EKG T AXIS: 45 DEGREES
EKG VENTRICULAR RATE: 123 BPM
EOSINOPHIL # BLD: 0.1 K/UL (ref 0–0.8)
EOSINOPHIL NFR BLD: 2 % (ref 0.5–7.8)
ERYTHROCYTE [DISTWIDTH] IN BLOOD BY AUTOMATED COUNT: 13.4 % (ref 11.9–14.6)
GLUCOSE BLD STRIP.AUTO-MCNC: 123 MG/DL (ref 65–100)
GLUCOSE SERPL-MCNC: 133 MG/DL (ref 65–100)
HCT VFR BLD AUTO: 34 % (ref 35.8–46.3)
HGB BLD-MCNC: 11 G/DL (ref 11.7–15.4)
IMM GRANULOCYTES # BLD AUTO: 0 K/UL (ref 0–0.5)
IMM GRANULOCYTES NFR BLD AUTO: 0 % (ref 0–5)
LYMPHOCYTES # BLD: 1 K/UL (ref 0.5–4.6)
LYMPHOCYTES NFR BLD: 18 % (ref 13–44)
MCH RBC QN AUTO: 28.7 PG (ref 26.1–32.9)
MCHC RBC AUTO-ENTMCNC: 32.4 G/DL (ref 31.4–35)
MCV RBC AUTO: 88.8 FL (ref 82–102)
MONOCYTES # BLD: 0.3 K/UL (ref 0.1–1.3)
MONOCYTES NFR BLD: 6 % (ref 4–12)
NEUTS SEG # BLD: 4.3 K/UL (ref 1.7–8.2)
NEUTS SEG NFR BLD: 74 % (ref 43–78)
NRBC # BLD: 0 K/UL (ref 0–0.2)
PLATELET # BLD AUTO: 106 K/UL (ref 150–450)
PMV BLD AUTO: 11.1 FL (ref 9.4–12.3)
POTASSIUM SERPL-SCNC: 3.7 MMOL/L (ref 3.5–5.1)
RBC # BLD AUTO: 3.83 M/UL (ref 4.05–5.2)
SERVICE CMNT-IMP: ABNORMAL
SODIUM SERPL-SCNC: 137 MMOL/L (ref 133–143)
WBC # BLD AUTO: 5.8 K/UL (ref 4.3–11.1)

## 2023-03-24 PROCEDURE — 93005 ELECTROCARDIOGRAM TRACING: CPT | Performed by: INTERNAL MEDICINE

## 2023-03-24 PROCEDURE — 2580000003 HC RX 258: Performed by: FAMILY MEDICINE

## 2023-03-24 PROCEDURE — 2580000003 HC RX 258: Performed by: INTERNAL MEDICINE

## 2023-03-24 PROCEDURE — 6370000000 HC RX 637 (ALT 250 FOR IP): Performed by: INTERNAL MEDICINE

## 2023-03-24 PROCEDURE — 36415 COLL VENOUS BLD VENIPUNCTURE: CPT

## 2023-03-24 PROCEDURE — 99221 1ST HOSP IP/OBS SF/LOW 40: CPT | Performed by: NURSE PRACTITIONER

## 2023-03-24 PROCEDURE — 85025 COMPLETE CBC W/AUTO DIFF WBC: CPT

## 2023-03-24 PROCEDURE — 80048 BASIC METABOLIC PNL TOTAL CA: CPT

## 2023-03-24 PROCEDURE — 6360000002 HC RX W HCPCS: Performed by: INTERNAL MEDICINE

## 2023-03-24 PROCEDURE — 6370000000 HC RX 637 (ALT 250 FOR IP): Performed by: FAMILY MEDICINE

## 2023-03-24 PROCEDURE — 82962 GLUCOSE BLOOD TEST: CPT

## 2023-03-24 PROCEDURE — 6360000002 HC RX W HCPCS: Performed by: FAMILY MEDICINE

## 2023-03-24 PROCEDURE — 1100000000 HC RM PRIVATE

## 2023-03-24 RX ORDER — NALOXONE HYDROCHLORIDE 1 MG/ML
2 INJECTION INTRAMUSCULAR; INTRAVENOUS; SUBCUTANEOUS PRN
Status: DISCONTINUED | OUTPATIENT
Start: 2023-03-24 | End: 2023-03-25 | Stop reason: HOSPADM

## 2023-03-24 RX ORDER — KETOROLAC TROMETHAMINE 30 MG/ML
15 INJECTION, SOLUTION INTRAMUSCULAR; INTRAVENOUS ONCE
Status: COMPLETED | OUTPATIENT
Start: 2023-03-24 | End: 2023-03-24

## 2023-03-24 RX ORDER — HYDROCODONE BITARTRATE AND ACETAMINOPHEN 5; 325 MG/1; MG/1
1 TABLET ORAL EVERY 6 HOURS PRN
Status: DISCONTINUED | OUTPATIENT
Start: 2023-03-24 | End: 2023-03-25

## 2023-03-24 RX ADMIN — SODIUM CHLORIDE, PRESERVATIVE FREE 10 ML: 5 INJECTION INTRAVENOUS at 22:14

## 2023-03-24 RX ADMIN — SODIUM CHLORIDE, POTASSIUM CHLORIDE, SODIUM LACTATE AND CALCIUM CHLORIDE: 600; 310; 30; 20 INJECTION, SOLUTION INTRAVENOUS at 07:31

## 2023-03-24 RX ADMIN — MORPHINE SULFATE 2 MG: 2 INJECTION, SOLUTION INTRAMUSCULAR; INTRAVENOUS at 01:24

## 2023-03-24 RX ADMIN — HYDROCODONE BITARTRATE AND ACETAMINOPHEN 1 TABLET: 5; 325 TABLET ORAL at 05:39

## 2023-03-24 RX ADMIN — ACETAMINOPHEN 650 MG: 325 TABLET ORAL at 12:19

## 2023-03-24 RX ADMIN — ENOXAPARIN SODIUM 40 MG: 100 INJECTION SUBCUTANEOUS at 09:39

## 2023-03-24 RX ADMIN — SODIUM CHLORIDE, PRESERVATIVE FREE 10 ML: 5 INJECTION INTRAVENOUS at 09:40

## 2023-03-24 RX ADMIN — NALOXONE HYDROCHLORIDE 2 MG: 1 INJECTION PARENTERAL at 11:49

## 2023-03-24 RX ADMIN — ONDANSETRON 4 MG: 4 TABLET, ORALLY DISINTEGRATING ORAL at 22:23

## 2023-03-24 RX ADMIN — HYDROCODONE BITARTRATE AND ACETAMINOPHEN 1 TABLET: 5; 325 TABLET ORAL at 17:44

## 2023-03-24 RX ADMIN — HYDROCODONE BITARTRATE AND ACETAMINOPHEN 1 TABLET: 5; 325 TABLET ORAL at 09:40

## 2023-03-24 RX ADMIN — KETOROLAC TROMETHAMINE 15 MG: 30 INJECTION, SOLUTION INTRAMUSCULAR at 13:44

## 2023-03-24 ASSESSMENT — PAIN - FUNCTIONAL ASSESSMENT
PAIN_FUNCTIONAL_ASSESSMENT: ACTIVITIES ARE NOT PREVENTED

## 2023-03-24 ASSESSMENT — PAIN SCALES - GENERAL
PAINLEVEL_OUTOF10: 4
PAINLEVEL_OUTOF10: 8
PAINLEVEL_OUTOF10: 0
PAINLEVEL_OUTOF10: 0
PAINLEVEL_OUTOF10: 8
PAINLEVEL_OUTOF10: 5
PAINLEVEL_OUTOF10: 7

## 2023-03-24 ASSESSMENT — PAIN DESCRIPTION - DESCRIPTORS
DESCRIPTORS: ACHING

## 2023-03-24 ASSESSMENT — PAIN DESCRIPTION - ORIENTATION
ORIENTATION: UPPER
ORIENTATION: MID
ORIENTATION: MID
ORIENTATION: UPPER

## 2023-03-24 ASSESSMENT — PAIN DESCRIPTION - ONSET
ONSET: PROGRESSIVE
ONSET: PROGRESSIVE

## 2023-03-24 ASSESSMENT — PAIN DESCRIPTION - LOCATION
LOCATION: NECK
LOCATION: BACK
LOCATION: NECK
LOCATION: NECK

## 2023-03-24 ASSESSMENT — PAIN DESCRIPTION - FREQUENCY
FREQUENCY: INTERMITTENT
FREQUENCY: INTERMITTENT

## 2023-03-24 ASSESSMENT — PAIN DESCRIPTION - PAIN TYPE
TYPE: ACUTE PAIN
TYPE: ACUTE PAIN

## 2023-03-24 NOTE — PROGRESS NOTES
cervical osteomyelitis. Blood cx sent and vancomycin started. ID consulted for cervical osteomyelitis and abx recs. Today she is afebrile. HIV negative as of 01/10/2023. Hep C Ab reactive on 01/10/2023, per patient she was not aware of this and has not been treated by Hep C before. Patient seen today resting in bed, she states her neck is painful/sore. She states she has been admitted for this same problem at [facility] and [facility] over the past two months but has never stayed longer than 2 weeks to receive treatment. We discussed importance of staying and receiving treatment, patient in agreement at this time. MRI from January vs now:  01/11/23: findings concerning for osteomyelitis/discitis at C2-C3.   03/22/23: Complete loss of the C2-C3 disc and collapse of central portion of the C3 vertebral body. 20% retrolisthesis of C2 on C3 with impaction of the anterior C2 inferior endplate into the central body - findings presumably the result of chronic discitis-osteomyelitis. Small amount of edema present in the marrow of the inferior portion of the C2 and throughout the C3 vertebrae. Patient Active Problem List   Diagnosis    Osteomyelitis of cervical spine ([facility] [address]    Accidental overdose of heroin ([facility] [address]    Discitis of cervical region    Drug abuse ([facility] [address]     No past medical history on file. No family history on file. Social History     Tobacco Use    Smoking status: Every Day     Packs/day: 0.30     Types: Cigarettes    Smokeless tobacco: Never   Substance Use Topics    Alcohol use: Not Currently     No past surgical history on file. Prior to Admission medications    Medication Sig Start Date End Date Taking?  Authorizing Provider   methadone (DOLOPHINE) 10 MG tablet Take 10 mg by mouth every 4 hours as needed for Withdrawal.  Patient not taking: No sig reported    Historical Provider, MD   doxycycline monohydrate (ADOXA) 100 MG tablet Take 100 mg by mouth 2 times daily  Patient not taking: No sig reported 21   Ar Automatic Reconciliation       Allergies   Allergen Reactions    Carbamazepine Other (See Comments)    Sulfamethoxazole-Trimethoprim Shortness Of Breath        Review of Systems:  A comprehensive review of systems is negative except as stated in the HPI. Objective:     Visit Vitals  BP 97/67   Pulse 90   Temp 97.7 °F (36.5 °C) (Oral)   Resp 18   Ht 5' 7.5\" (1.715 m)   Wt 121 lb 6.4 oz (55.1 kg)   SpO2 97%   BMI 18.73 kg/m²     Temp (24hrs), Av.6 °F (37 °C), Min:97.7 °F (36.5 °C), Max:99 °F (37.2 °C)       Lines:  Peripheral IV:       Physical Exam:    General:  Alert, cooperative   Eyes:  Sclera anicteric. Pupils equally round and reactive to light. Lungs:   Clear to auscultation bilaterally, good effort   CV:  Regular rate and rhythm,no murmur, click, rub or gallop   Abdomen:   Soft, non-tender.  bowel sounds normal. non-distended   Extremities: No cyanosis or edema   Skin: Skin color, texture, turgor normal. no acute rash or lesions       Musculoskeletal: Pain with active range of motion in cervical spine, she is able to move all extremities with no weakness noted    Lines/Devices:  Intact, no erythema, drainage or tenderness   Psych: Alert and oriented, normal mood affect given the setting       Data Review:     CBC:  Recent Labs     23  1140 23  0519   WBC 5.5 5.8   HGB 13.8 11.0*   HCT 43.0 34.0*   * 106*         BMP:  Recent Labs     23  1140 23  0538 23  0519   BUN 7 4* 5*    139 137   K 3.9 3.7 3.7    107 106   CO2 32 29 30         LFTS:  Recent Labs     23  1140   ALT 68*         Microbiology:   Reviewed    Imaging:   Reviewed    Signed By: MANUEL Silveira     2023

## 2023-03-24 NOTE — PROGRESS NOTES
Hospitalist Progress Note   Admit Date:  3/23/2023  1:33 AM   Name:  Patsy Ferreira   Age:  38 y.o. Sex:  female  :  1980   MRN:  [id number]   Room:  [facility]/    Presenting Complaint: No chief complaint on file. Reason(s) for Admission: Osteomyelitis of cervical spine [facility] Course:   Ms. Leonardo Meza is a 39 yo WF with pmh of illicit drug use who presented 3/23 for worsening neck pain. She has a pmh of osteomyelitis of cervical spine but in past has left AMA prior to completion of antibiotic therapy. Imaging/MRI reveals:       Complete loss of the C2-C3 disc and collapse of the superior half of the C3    vertebral body, with 50% loss of height, no retropulsion      She was on IV vancomycin and ID has been consulted who recommended obtaining culture as microbial etiology unknown and to hold antibiotics. Neurosurgery does not feel surgical intervention currently necessary but recommended Dyer collar. This was discussed this morning with patient. She has ROM of neck and no focal deficits of extremities but neck is uncomfortable. Assessment & Plan:     Principal Problem:    Osteomyelitis of cervical spine ([facility] [address]  Plan: antibiotics held by ID. Needs culture to pin down microbial etiology- neurosurgery on board. Place Aspen collar. Active Problems:    Drug abuse ([address]  Plan: cessation advised. PT/OT evals and PPD needed/ordered? Yes  Diet:  ADULT DIET; Regular  ADULT ORAL NUTRITION SUPPLEMENT; Breakfast, Dinner; Standard High Calorie/High Protein Oral Supplement  VTE prophylaxis: Lovenox  Code status: Full Code    Hospital Problems:  Principal Problem:    Osteomyelitis of cervical spine ([address]  Active Problems:    Drug abuse ([facility] [address]  Resolved Problems:    * No resolved hospital problems.  *      Objective:   Patient Vitals for the past 24 hrs:   Temp Pulse Resp BP SpO2   23 0726 97.7 °F (36.5 °C) 90 18 97/67 97 %   23 0609 -- -- 18 -- --   23 0539 -- --

## 2023-03-24 NOTE — PROGRESS NOTES
Rapid response called today on pt after found in floor after. Pt was wearing cervical collar during fall. Afterwards, pt stated she had used heroine. After narcan, pt became alert and oriented. Pt moved to new room and all belongings searched. Family made aware and sitter has since remained at bedside. C/O some sacral soreness afterwards, but pt states she doesn't feel like anything was broken. Increased pain after narcan, tylenol x1 then toradol x1 per MD order. Norco 5mg changed from Q4H to Q6H given 1 dose this evening,. Orders received to only give with sitter in room to ensure another overdose. Oncoming night nurse made aware of the situation.

## 2023-03-24 NOTE — CARE COORDINATION
Case Management Assessment  Initial Evaluation    Date/Time of Evaluation: 3/24/2023 3:59 PM  Assessment Completed by: LUCIANO Newell    If patient is discharged prior to next notation, then this note serves as note for discharge by case management. Patient Name: Dylon Santizo                   YOB: 1980  Diagnosis: Osteomyelitis of cervical spine [facility]) [M46.22]                   Date / Time: 3/23/2023  1:33 AM    Patient Admission Status: Inpatient   Readmission Risk (Low < 19, Mod (19-27), High > 27): Readmission Risk Score: 8.2    Current PCP: None None  PCP verified by CM? No (No PCP)    Chart Reviewed: Yes      History Provided by: Medical Record, Patient  Patient Orientation: Alert and Oriented, Person, Place, Self    Patient Cognition: Alert    Hospitalization in the last 30 days (Readmission):  No    If yes, Readmission Assessment in CM Navigator will be completed. Advance Directives:      Code Status: Full Code   Patient's Primary Decision Maker is: Patient Declined (Legal Next of Kin Remains as Decision Maker)    Primary Decision MakerJodi Sick Spouse - [phone]    Discharge Planning:    Patient lives with: Spouse/Significant Other (Girlfriend) Type of Home: Other (Comment) ([employer])  Primary Care Giver: Self  Patient Support Systems include: Spouse/Significant Other (Girlfriend)   Current Financial resources: Medicaid ([facility] (NOT in network with this hospital system))  Current community resources: None  Current services prior to admission: None            Current DME:  None            Type of Home Care services:  None    ADLS  Prior functional level: Independent in ADLs/IADLs  Current functional level: Independent in ADLs/IADLs    PT AM-PAC:   /24  OT AM-PAC:   /24    Family can provide assistance at DC: No  Would you like Case Management to discuss the discharge plan with any other family members/significant others, and if so, who?  No  Plans to Return to APPLICABLE: The Patient and/or patient representative Silvia Pena and her family were provided with a choice of provider and agrees with the discharge plan. Freedom of choice list with basic dialogue that supports the patient's individualized plan of care/goals and shares the quality data associated with the providers was provided to: Patient   Patient Representative Name:       The Patient and/or Patient Representative Agree with the Discharge Plan?       LUCIANO Cummings  Case Management Department

## 2023-03-24 NOTE — PROGRESS NOTES
Responded to Rapid Response called overhead for patient. At the time, patient was in room 501. Staff were providing care upon my arrival. Offered a prayerful, supportive presence. Patient became responsive.      Janna Trujillo 70  Board Certified

## 2023-03-24 NOTE — PROGRESS NOTES
RRT Clinical Rounding Nurse Update    Vitals:    03/24/23 1010 03/24/23 1149 03/24/23 1554 03/24/23 1744   BP:  106/62 116/84    Pulse:  97 (!) 109    Resp: 14 16 20 17   Temp:  99 °F (37.2 °C) 98.4 °F (36.9 °C)    TempSrc:  Oral Oral    SpO2:       Weight:       Height:            DETERIORATION INDEX SCORE: 18    ASSESSMENT:  Previous outreach assessment was reviewed. Pt A&O, with sitter at bedside. States she apologizes for her behavior earlier. VSS and in NAD. PLAN:  Will follow per RRT Clinical Rounding Program protocol.     Bing Garsia RN  [place]: [place]: [phone]

## 2023-03-24 NOTE — PROGRESS NOTES
Rapid Response Team Note      Subjective: Responded to Rapid Response secondary to desaturation d/t drug overdose. Summary: Pt found on floor unresponsive, O2 sats in the 40's with used needle next to her. Placed back in bed and 2mg Narcan given - with immediate improvement in mentation, saturation, etc. Pt able to be quickly taken off of oxygen after narcan administration. EKG done - ST in the 140's. Security called and pt's room to be searched. No visitors to be allowed from now on. Sitter ordered as well. Clinical episode caused by drug OD, resolved w/ narcan. Will still add to outreach list d/t protocol. See Rapid Response/Code Blue Narrator for full documentation    Outcome: Patient to remain in current location. Will follow-up per Critical Care Clinical Rounding protocol.     Adonis Penny RN  [place] [phone]

## 2023-03-24 NOTE — PROGRESS NOTES
Responded to RRT as patient found on floor slumped over a needle saturated in urine. Unresponsive and hypoxic into 40s. Placed in bed and on monitor. Bagging initiated. Narcan 2mg x one administered with almost immediate in improvement in mental status and oxygen saturations. HR in 140s- to be expected, check EKG  No visitors, patient to be moved out of room for security to do room search. Needle/syringe given to security. Will need constant supervision/sitter. Contact [facility] authorities.      Keith Tay MD

## 2023-03-24 NOTE — CONSULTS
NEUROSURGERY CONSULT NOTE:     CC: osteomyelitis/discitis at C2-3    Assessment and Plan:   -no neurosurgical intervention required  -ID to manage antibiotics  -aspen collar at all times    HPI:   Starlette Leyden y.o. female with a PMH of osteomyelitis/discitis and was being treated at [facility] on 1/11/2023, IV drug use, and polysubstance abuse who presented tot he ER with right sided neck pain. According to notes the patient left [facility] AMA after starting treatment for her osteomyelitis and came to [facility] where she left AMA again before completing treatment for her infection. Pt states she has been having right sided neck pain for about 3 months but recently the pain became much worse. Pt states she still has full strength but just hurts when trying to do too much. Pt does not need any neurosurgical intervention at this time. Pt is to stay in an aspen collar at all times until the infection has been fully successfully treated. No past medical history on file. No past surgical history on file.   Allergies   Allergen Reactions    Carbamazepine Other (See Comments)    Sulfamethoxazole-Trimethoprim Shortness Of Breath     Social History     Socioeconomic History    Marital status:      Spouse name: Not on file    Number of children: Not on file    Years of education: Not on file    Highest education level: Not on file   Occupational History    Not on file   Tobacco Use    Smoking status: Every Day     Packs/day: 0.30     Types: Cigarettes    Smokeless tobacco: Never   Substance and Sexual Activity    Alcohol use: Not Currently    Drug use: Not Currently    Sexual activity: Yes     Partners: Female   Other Topics Concern    Not on file   Social History Narrative    Patient states that her and her wife live in a hotel at present     Social Determinants of Health     Financial Resource Strain: Not on file   Food Insecurity: Not on file   Transportation Needs: Not on file   Physical Activity: Not on file Stress: Not on file   Social Connections: Not on file   Intimate Partner Violence: Not on file   Housing Stability: Not on file         Physical Exam:   BP 97/67   Pulse 90   Temp 97.7 °F (36.5 °C) (Oral)   Resp 18   Ht 5' 7.5\" (1.715 m)   Wt 121 lb 6.4 oz (55.1 kg)   SpO2 97%   BMI 18.73 kg/m²           ROS Review of Systems    Constitutional:                    No recent weight changes, fever, fatigue, sleep difficulties, loss of appetite   ENT/Mouth:  POShearing loss, POSringing in the ears, chronic sinus problem, nose bleeds,sore throat, voice change, hoarseness, swollen glands in neck, or difficulties with chewing and swallowing. Cardiovascular:  No chest pain/angina pectoris, palpitations, swelling of feet/ankles/hands, or calf pain while walking. Respiratory: No chronic or frequent coughs, spitting up blood, shortness of breath, POSasthma, or wheezing. Gastrointestinal: No a bdominal pain, heartburn, nausea, vomiting, constipation, or frequent diarrhea     Genitourinary: No frequent urination, burning or painful urination, or blood in urine     Musculoskeletal:   POS neck pain     Integument:   No rash/itching     Neurological:   dizziness/vertigo,POS numbness/tingling sensation, tremors, POSweakness in limbs, frequent or recurring headaches, memory loss or confusion.        Neuro Assessment:    EVAN  Alert and Oriented x4   GCS15    Speech clear  Reflexes 2+ equal and bilateral  Cranial nerves I-VII grossly intact  Sensation equal bilaterally  Gait normal  Bowel and bladder control intact  Upper extremities: shoulders 5/5 bilateral, biceps 5/5 bilateral, triceps 5/5, hand intrinsics 5/5 bilateral  Lower extremities: HF 5/5 bilateral, knees 5/5 bilateral, hamsting 5/5 bilateral, DF 5/5 bilateral, PF 5/5 bilateral    46 min  RINA Henry - CNP

## 2023-03-24 NOTE — PROGRESS NOTES
Patient found on floor, pale, urinated on self.  When picked up needle fell out from underneath patients shirt     1149- rapid response called    1149- IV placed    1149- narcan ordered    02 50%- oxygen placed      144/95 plulse 108 98% o2 temp 97.5    1150- narcan given    1151- pulse 128    1152- hr 146    1153- Ekg ordered     145/84 02 100 hr 113

## 2023-03-25 ENCOUNTER — HOSPITAL ENCOUNTER (EMERGENCY)
Age: 43
Discharge: HOME | End: 2023-03-25
Attending: EMERGENCY MEDICINE
Payer: COMMERCIAL

## 2023-03-25 VITALS
WEIGHT: 125 LBS | TEMPERATURE: 98.1 F | HEIGHT: 67 IN | OXYGEN SATURATION: 98 % | DIASTOLIC BLOOD PRESSURE: 89 MMHG | HEART RATE: 101 BPM | SYSTOLIC BLOOD PRESSURE: 117 MMHG | BODY MASS INDEX: 19.62 KG/M2 | RESPIRATION RATE: 20 BRPM

## 2023-03-25 VITALS
BODY MASS INDEX: 18.4 KG/M2 | RESPIRATION RATE: 20 BRPM | SYSTOLIC BLOOD PRESSURE: 109 MMHG | OXYGEN SATURATION: 99 % | HEIGHT: 68 IN | TEMPERATURE: 98.2 F | HEART RATE: 111 BPM | DIASTOLIC BLOOD PRESSURE: 73 MMHG | WEIGHT: 121.4 LBS

## 2023-03-25 DIAGNOSIS — M46.22 OSTEOMYELITIS OF CERVICAL SPINE (HCC): ICD-10-CM

## 2023-03-25 DIAGNOSIS — L73.9 FOLLICULITIS: Primary | ICD-10-CM

## 2023-03-25 DIAGNOSIS — F19.10 DRUG ABUSE (HCC): ICD-10-CM

## 2023-03-25 PROBLEM — L02.32 BOIL OF BUTTOCK: Status: ACTIVE | Noted: 2023-03-25

## 2023-03-25 LAB
ANION GAP SERPL CALC-SCNC: ABNORMAL MMOL/L (ref 2–11)
BASOPHILS # BLD: 0 K/UL (ref 0–0.2)
BASOPHILS NFR BLD: 0 % (ref 0–2)
BUN SERPL-MCNC: 9 MG/DL (ref 6–23)
CALCIUM SERPL-MCNC: 8.4 MG/DL (ref 8.3–10.4)
CHLORIDE SERPL-SCNC: 105 MMOL/L (ref 101–110)
CO2 SERPL-SCNC: 32 MMOL/L (ref 21–32)
CREAT SERPL-MCNC: 0.7 MG/DL (ref 0.6–1)
DIFFERENTIAL METHOD BLD: ABNORMAL
EOSINOPHIL # BLD: 0.1 K/UL (ref 0–0.8)
EOSINOPHIL NFR BLD: 2 % (ref 0.5–7.8)
ERYTHROCYTE [DISTWIDTH] IN BLOOD BY AUTOMATED COUNT: 13.5 % (ref 11.9–14.6)
GLUCOSE SERPL-MCNC: 96 MG/DL (ref 65–100)
HCT VFR BLD AUTO: 32.5 % (ref 35.8–46.3)
HGB BLD-MCNC: 10.4 G/DL (ref 11.7–15.4)
IMM GRANULOCYTES # BLD AUTO: 0 K/UL (ref 0–0.5)
IMM GRANULOCYTES NFR BLD AUTO: 0 % (ref 0–5)
LYMPHOCYTES # BLD: 0.9 K/UL (ref 0.5–4.6)
LYMPHOCYTES NFR BLD: 26 % (ref 13–44)
MAGNESIUM SERPL-MCNC: 1.6 MG/DL (ref 1.8–2.4)
MCH RBC QN AUTO: 28.2 PG (ref 26.1–32.9)
MCHC RBC AUTO-ENTMCNC: 32 G/DL (ref 31.4–35)
MCV RBC AUTO: 88.1 FL (ref 82–102)
MONOCYTES # BLD: 0.2 K/UL (ref 0.1–1.3)
MONOCYTES NFR BLD: 7 % (ref 4–12)
NEUTS SEG # BLD: 2.2 K/UL (ref 1.7–8.2)
NEUTS SEG NFR BLD: 65 % (ref 43–78)
NRBC # BLD: 0 K/UL (ref 0–0.2)
PLATELET # BLD AUTO: 84 K/UL (ref 150–450)
PMV BLD AUTO: 11.6 FL (ref 9.4–12.3)
POTASSIUM SERPL-SCNC: 3.3 MMOL/L (ref 3.5–5.1)
RBC # BLD AUTO: 3.69 M/UL (ref 4.05–5.2)
SODIUM SERPL-SCNC: 136 MMOL/L (ref 133–143)
WBC # BLD AUTO: 3.4 K/UL (ref 4.3–11.1)

## 2023-03-25 PROCEDURE — 87077 CULTURE AEROBIC IDENTIFY: CPT

## 2023-03-25 PROCEDURE — 6370000000 HC RX 637 (ALT 250 FOR IP): Performed by: FAMILY MEDICINE

## 2023-03-25 PROCEDURE — 85025 COMPLETE CBC W/AUTO DIFF WBC: CPT

## 2023-03-25 PROCEDURE — 6360000002 HC RX W HCPCS: Performed by: FAMILY MEDICINE

## 2023-03-25 PROCEDURE — 87075 CULTR BACTERIA EXCEPT BLOOD: CPT

## 2023-03-25 PROCEDURE — 87205 SMEAR GRAM STAIN: CPT

## 2023-03-25 PROCEDURE — 80048 BASIC METABOLIC PNL TOTAL CA: CPT

## 2023-03-25 PROCEDURE — 2580000003 HC RX 258: Performed by: FAMILY MEDICINE

## 2023-03-25 PROCEDURE — 6370000000 HC RX 637 (ALT 250 FOR IP): Performed by: INTERNAL MEDICINE

## 2023-03-25 PROCEDURE — 99283 EMERGENCY DEPT VISIT LOW MDM: CPT

## 2023-03-25 PROCEDURE — 36415 COLL VENOUS BLD VENIPUNCTURE: CPT

## 2023-03-25 PROCEDURE — 87186 SC STD MICRODIL/AGAR DIL: CPT

## 2023-03-25 PROCEDURE — 83735 ASSAY OF MAGNESIUM: CPT

## 2023-03-25 RX ORDER — DOXYCYCLINE HYCLATE 100 MG
100 TABLET ORAL 2 TIMES DAILY
Qty: 20 TABLET | Refills: 0 | Status: SHIPPED | OUTPATIENT
Start: 2023-03-25 | End: 2023-04-04

## 2023-03-25 RX ORDER — OXYCODONE HYDROCHLORIDE 5 MG/1
10 TABLET ORAL EVERY 4 HOURS PRN
Status: DISCONTINUED | OUTPATIENT
Start: 2023-03-25 | End: 2023-03-25 | Stop reason: HOSPADM

## 2023-03-25 RX ORDER — ACETAMINOPHEN 325 MG/1
650 TABLET ORAL 3 TIMES DAILY
Status: DISCONTINUED | OUTPATIENT
Start: 2023-03-25 | End: 2023-03-25 | Stop reason: HOSPADM

## 2023-03-25 RX ORDER — CEPHALEXIN 250 MG/1
500 CAPSULE ORAL EVERY 6 HOURS SCHEDULED
Status: DISCONTINUED | OUTPATIENT
Start: 2023-03-25 | End: 2023-03-25 | Stop reason: HOSPADM

## 2023-03-25 RX ORDER — MAGNESIUM SULFATE IN WATER 40 MG/ML
2000 INJECTION, SOLUTION INTRAVENOUS ONCE
Status: COMPLETED | OUTPATIENT
Start: 2023-03-25 | End: 2023-03-25

## 2023-03-25 RX ORDER — BUPRENORPHINE HYDROCHLORIDE 8 MG/1
8 TABLET SUBLINGUAL DAILY
Qty: 7 TABLET | Refills: 0 | Status: SHIPPED | OUTPATIENT
Start: 2023-03-25 | End: 2023-04-01

## 2023-03-25 RX ORDER — ACETAMINOPHEN 500 MG
1000 TABLET ORAL 3 TIMES DAILY
Status: DISCONTINUED | OUTPATIENT
Start: 2023-03-25 | End: 2023-03-25

## 2023-03-25 RX ORDER — ONDANSETRON 4 MG/1
4 TABLET, FILM COATED ORAL 3 TIMES DAILY PRN
Qty: 15 TABLET | Refills: 0 | Status: SHIPPED | OUTPATIENT
Start: 2023-03-25

## 2023-03-25 RX ORDER — DOXYCYCLINE HYCLATE 100 MG/1
100 CAPSULE ORAL EVERY 12 HOURS SCHEDULED
Status: DISCONTINUED | OUTPATIENT
Start: 2023-03-25 | End: 2023-03-25 | Stop reason: HOSPADM

## 2023-03-25 RX ORDER — HYDROCODONE BITARTRATE AND ACETAMINOPHEN 5; 325 MG/1; MG/1
1 TABLET ORAL EVERY 4 HOURS PRN
Status: DISCONTINUED | OUTPATIENT
Start: 2023-03-25 | End: 2023-03-25

## 2023-03-25 RX ADMIN — ACETAMINOPHEN 650 MG: 325 TABLET ORAL at 15:47

## 2023-03-25 RX ADMIN — POTASSIUM BICARBONATE 20 MEQ: 782 TABLET, EFFERVESCENT ORAL at 12:08

## 2023-03-25 RX ADMIN — SODIUM CHLORIDE, PRESERVATIVE FREE 10 ML: 5 INJECTION INTRAVENOUS at 09:48

## 2023-03-25 RX ADMIN — HYDROCODONE BITARTRATE AND ACETAMINOPHEN 1 TABLET: 5; 325 TABLET ORAL at 13:15

## 2023-03-25 RX ADMIN — DOXYCYCLINE HYCLATE 100 MG: 100 CAPSULE ORAL at 20:04

## 2023-03-25 RX ADMIN — MAGNESIUM SULFATE HEPTAHYDRATE 2000 MG: 40 INJECTION, SOLUTION INTRAVENOUS at 11:53

## 2023-03-25 RX ADMIN — SODIUM CHLORIDE, PRESERVATIVE FREE 10 ML: 5 INJECTION INTRAVENOUS at 20:08

## 2023-03-25 RX ADMIN — ONDANSETRON 4 MG: 4 TABLET, ORALLY DISINTEGRATING ORAL at 18:00

## 2023-03-25 RX ADMIN — HYDROCODONE BITARTRATE AND ACETAMINOPHEN 1 TABLET: 5; 325 TABLET ORAL at 07:44

## 2023-03-25 RX ADMIN — OXYCODONE 10 MG: 5 TABLET ORAL at 15:47

## 2023-03-25 RX ADMIN — ACETAMINOPHEN 650 MG: 325 TABLET ORAL at 20:04

## 2023-03-25 RX ADMIN — ENOXAPARIN SODIUM 40 MG: 100 INJECTION SUBCUTANEOUS at 09:47

## 2023-03-25 RX ADMIN — OXYCODONE 10 MG: 5 TABLET ORAL at 20:05

## 2023-03-25 RX ADMIN — HYDROCODONE BITARTRATE AND ACETAMINOPHEN 1 TABLET: 5; 325 TABLET ORAL at 01:33

## 2023-03-25 RX ADMIN — CEPHALEXIN 500 MG: 250 CAPSULE ORAL at 18:00

## 2023-03-25 RX ADMIN — POTASSIUM BICARBONATE 20 MEQ: 782 TABLET, EFFERVESCENT ORAL at 20:05

## 2023-03-25 ASSESSMENT — PAIN SCALES - GENERAL
PAINLEVEL_OUTOF10: 9
PAINLEVEL_OUTOF10: 8
PAINLEVEL_OUTOF10: 9
PAINLEVEL_OUTOF10: 7

## 2023-03-25 ASSESSMENT — LIFESTYLE VARIABLES
HOW MANY STANDARD DRINKS CONTAINING ALCOHOL DO YOU HAVE ON A TYPICAL DAY: PATIENT DOES NOT DRINK
HOW OFTEN DO YOU HAVE A DRINK CONTAINING ALCOHOL: NEVER

## 2023-03-25 ASSESSMENT — PAIN DESCRIPTION - DESCRIPTORS
DESCRIPTORS: ACHING
DESCRIPTORS: ACHING;SORE
DESCRIPTORS: ACHING
DESCRIPTORS: ACHING

## 2023-03-25 ASSESSMENT — PAIN DESCRIPTION - ORIENTATION
ORIENTATION: UPPER
ORIENTATION: MID

## 2023-03-25 ASSESSMENT — PAIN DESCRIPTION - LOCATION
LOCATION: NECK

## 2023-03-25 ASSESSMENT — PAIN - FUNCTIONAL ASSESSMENT
PAIN_FUNCTIONAL_ASSESSMENT: ACTIVITIES ARE NOT PREVENTED
PAIN_FUNCTIONAL_ASSESSMENT: 0-10
PAIN_FUNCTIONAL_ASSESSMENT: ACTIVITIES ARE NOT PREVENTED

## 2023-03-25 ASSESSMENT — PAIN DESCRIPTION - PAIN TYPE: TYPE: ACUTE PAIN

## 2023-03-25 NOTE — Clinical Note
Irene Hinson was seen and treated in our emergency department on 3/25/2023. She may return to work on . If you have any questions or concerns, please don't hesitate to call.       Kiah Britton MD

## 2023-03-25 NOTE — Clinical Note
Kurtis Ernesto was seen and treated in our emergency department on 3/25/2023. She was in the hospital from [facility] from 3/22 to 3/25.     Stephon Morrow MD

## 2023-03-25 NOTE — CONSULTS
Interventional Radiology    Consulted to bx C2-C3 to evaluate for discitis-osteomyelitis. Chart and images reviewed. Cannot bx C2-C3, too high. If ID requires tissue recommend Neurosurgery re-consult.

## 2023-03-25 NOTE — PROGRESS NOTES
[facility] CRITICAL CARE OUTREACH NURSE PROGRESS REPORT      SUBJECTIVE: Called to assess patient secondary to outreach protocol. Vitals:    03/24/23 1149 03/24/23 1554 03/24/23 1744 03/24/23 1942   BP: 106/62 116/84  114/68   Pulse: 97 (!) 109  (!) 107   Resp: 16 20 17 18   Temp: 99 °F (37.2 °C) 98.4 °F (36.9 °C)  98.6 °F (37 °C)   TempSrc: Oral Oral  Oral   SpO2:    98%   Weight:       Height:            LAB DATA:    Recent Labs     03/22/23  1140 03/23/23  0538 03/24/23  0519    139 137   K 3.9 3.7 3.7    107 106   CO2 32 29 30   BUN 7 4* 5*   GLOB 4.2  --   --    ALT 68*  --   --         Recent Labs     03/22/23  1140 03/24/23  0519   WBC 5.5 5.8   HGB 13.8 11.0*   HCT 43.0 34.0*   * 106*          OBJECTIVE: On arrival to room, I found patient to be resting in bed with sitter at bedside. Pain Assessment                                             ASSESSMENT:  Pt is axox4. VSS. Pt is very apologetic for overdosing earlier in the day. Pt stated that she recently moved back to this area. Requesting referrals for addict support group once she leaves the hospital. States she doesn't have a good support system here other than her partner. Encouraged her to speak with her  and MD.     PLAN:  Will continue to monitor per protocol.

## 2023-03-25 NOTE — PROGRESS NOTES
Hospitalist Progress Note   Admit Date:  3/23/2023  1:33 AM   Name:  Farzaneh Mccabe   Age:  38 y.o. Sex:  female  :  1980   MRN:  [id number]   Room:  [facility]/    Presenting/Chief Complaint: No chief complaint on file. Reason(s) for Admission: Osteomyelitis of cervical spine [facility]) [M46.22]     Hospital Course:   Farzaneh Mccabe is a 38 y.o. female with medical history of illicit drug use, cervical spine osteomyelitis who presented to [facility] ED with complaint of neck pain. Pain is located more on the R side of her neck. She denies any known fevers. Not a great historian. Upon ER evaluation, labs are WNL. WBC is 5.5. Blood cultures obtained. Given h/o recent cervical spine osteomyelitis, MRI was obtained of cervical and thoracic spine. Radiology read as follows:  1. Severe motion degradation, particularly on the sagittal T2-weighted sequence,    limiting interpretation. 2. Complete loss of the C2-C3 disc and collapse of the central portion of the C3    vertebral body. 20% retrolisthesis of C2 on C3 with impaction of the anterior    C2 inferior endplate into the central C3 body. Findings are presumably the    result of chronic discitis-osteomyelitis given the history. A small amount of    edema is present in the marrow of the inferior portion of C2 and throughout the    C3 vertebra. 2. Moderate degenerative changes at C5-C6 and C6-C7 as above. Per chart review, patient has left AMA from [facility] as well as [facility] over the past 2 months. She has not finished a course of antibiotics at either of these hospitals due to leaving prior to completion. Patient has been transferred to [facility] for further care. Patient was admitted with osteomyelitis of cervical spine in setting of IV drug use. CRP 0.5, no leukocytosis and no fever.  MRI C-spine shows complete loss of C2-C3 disc and collapse of central portion of C3 vertebral body; 20% retrolisthesis of C2 on C3 with impaction of anterior C2 Auto Differential    Collection Time: 03/25/23  6:45 AM   Result Value Ref Range    WBC 3.4 (L) 4.3 - 11.1 K/uL    RBC 3.69 (L) 4.05 - 5.2 M/uL    Hemoglobin 10.4 (L) 11.7 - 15.4 g/dL    Hematocrit 32.5 (L) 35.8 - 46.3 %    MCV 88.1 82 - 102 FL    MCH 28.2 26.1 - 32.9 PG    MCHC 32.0 31.4 - 35.0 g/dL    RDW 13.5 11.9 - 14.6 %    Platelets 84 (L) 164 - 450 K/uL    MPV 11.6 9.4 - 12.3 FL    nRBC 0.00 0.0 - 0.2 K/uL    Differential Type AUTOMATED      Seg Neutrophils 65 43 - 78 %    Lymphocytes 26 13 - 44 %    Monocytes 7 4.0 - 12.0 %    Eosinophils % 2 0.5 - 7.8 %    Basophils 0 0.0 - 2.0 %    Immature Granulocytes 0 0.0 - 5.0 %    Segs Absolute 2.2 1.7 - 8.2 K/UL    Absolute Lymph # 0.9 0.5 - 4.6 K/UL    Absolute Mono # 0.2 0.1 - 1.3 K/UL    Absolute Eos # 0.1 0.0 - 0.8 K/UL    Basophils Absolute 0.0 0.0 - 0.2 K/UL    Absolute Immature Granulocyte 0.0 0.0 - 0.5 K/UL   Magnesium    Collection Time: 03/25/23  6:45 AM   Result Value Ref Range    Magnesium 1.6 (L) 1.8 - 2.4 mg/dL         Other Studies:  No orders to display       Current Meds:  Current Facility-Administered Medications   Medication Dose Route Frequency    potassium bicarb-citric acid (EFFER-K) effervescent tablet 20 mEq  20 mEq Oral BID    HYDROcodone-acetaminophen (NORCO) 5-325 MG per tablet 1 tablet  1 tablet Oral Q4H PRN    naloxone (NARCAN) injection 2 mg  2 mg IntraVENous PRN    sodium chloride flush 0.9 % injection 5-40 mL  5-40 mL IntraVENous 2 times per day    sodium chloride flush 0.9 % injection 5-40 mL  5-40 mL IntraVENous PRN    0.9 % sodium chloride infusion   IntraVENous PRN    ondansetron (ZOFRAN-ODT) disintegrating tablet 4 mg  4 mg Oral Q8H PRN    Or    ondansetron (ZOFRAN) injection 4 mg  4 mg IntraVENous Q6H PRN    polyethylene glycol (GLYCOLAX) packet 17 g  17 g Oral Daily PRN    acetaminophen (TYLENOL) tablet 650 mg  650 mg Oral Q6H PRN    Or    acetaminophen (TYLENOL) suppository 650 mg  650 mg Rectal Q6H PRN    enoxaparin (LOVENOX)

## 2023-03-25 NOTE — PROGRESS NOTES
Updated pt's spouse on speaker phone with patient in the room. Discussed care plan and both expressed understanding. Spouse requested referral to methadone clinic and available resources for drug rehab on discharge for patient. All questions answered.     Divya Lyle, DO

## 2023-03-25 NOTE — PROGRESS NOTES
RRT Clinical Rounding Nurse Progress Report      SUBJECTIVE: Patient assessed secondary to recent rapid response. Vitals:    03/25/23 0355 03/25/23 0730 03/25/23 0744 03/25/23 1037   BP: 113/81 106/80  106/73   Pulse: (!) 108 (!) 104  (!) 102   Resp: 17 18 18 18   Temp: 98.4 °F (36.9 °C) 98.4 °F (36.9 °C)  98.4 °F (36.9 °C)   TempSrc: Oral Oral  Oral   SpO2: 100% 98%  98%   Weight:       Height:            DETERIORATION INDEX SCORE: 19    ASSESSMENT:  Patient wakes to voice. Oriented x4. Respirations unlabored on room air. Cervical collar in place. Denies numbness/tingling/weakness. Reports ongoing neck pain. Sitting at bedside. VS, labs, and progress notes reviewed. PLAN:  Will discharge from [facility] Clinical Rounding Program per protocol. Please call if needed.     Pepper Kim RN  [place]: [place]: [phone]

## 2023-03-25 NOTE — Clinical Note
Rashad Joshua was seen and treated in our emergency department on 3/25/2023. She was in the hospital from [facility] from 3/22 to 3/25.     Camila Tomlinson MD

## 2023-03-25 NOTE — PROGRESS NOTES
Date of Outreach Update:  Noel Gilmore was seen and assessed. Previous Outreach assessment has been reviewed. There have been no significant clinical changes since the completion of the last dated Outreach assessment. Will continue to follow up per outreach protocol.     Signed By:   Lonny Franco RN    March 25, 2023 4:47 AM

## 2023-03-25 NOTE — PROGRESS NOTES
I was contacted by the hospitalist that interventional radiology has deferred to neurosurgery for biopsy of this area of concern due to the location. Neurosurgery feels it is too high risk without findings of acute osteomyelitis. So, infectious disease is being asked to make antibiotic recommendations with no focused cultures. Given the IVDA, the possibilities are too broad to just pick something randomly. However, the CRP is 0.5 and no fevers or leukocytosis, combined with the chronicity of findings on her MRI, its not clear there is ongoing infection. Her acute pain could be due to distraction at C2-C3 so will defer to neurosurgery for further management. ID will sign off.   Thank

## 2023-03-26 LAB
BACTERIA SPEC CULT: NORMAL
GRAM STN SPEC: NORMAL
SERVICE CMNT-IMP: NORMAL

## 2023-03-26 NOTE — ED TRIAGE NOTES
Pt left 5th floor out of frustration that her wife was not permitted to visit her. Wound on L buttock with culture sent and damage to C2/C3 with concern for infection/osteo. Wants to have the antibiotics, nausea and pain medication prescribed that was likely to be a part of her discharge planning for tomorrow.

## 2023-03-26 NOTE — DISCHARGE INSTRUCTIONS
Dario Barnard   [phone]   They have multiple resources to help you. Please call.  www.[facility]. org     Other local resources that are available are: The [address] [place]. org for inpatient and outpatient substance abuse issues. [place] [phone]  Medication assisted treatment    [address] [place] [address]  [phone]     Suicide Hotline   6-176-SCQGAWD     Narcotics Anonymous   www.na. org  Alcoholics Anonymous  www.aa.org

## 2023-03-26 NOTE — PROGRESS NOTES
Pt asked for nurse to come to room, stated \"I want to leave, theres no reason for me to be here. \" Nurse tried to educate pt on risks of leaving AMA, Pt refused asked to sign AMA papers. AMA papers signs, pt requesting things that were taken and locked by security. Security was called and belongings returned to pt. Nurse told pt that the provider needs to speak with her before she leaves and pt stated, \"no, I don't want to wait. I want you to take my IV out and I'm not waiting for her. \" Nurse notified provider and removed pt IV. Nurse tried to get pt to wait to see provider before leaving, pt refused again stating she is leaving.

## 2023-03-26 NOTE — ED NOTES
I have reviewed discharge instructions with the patient. The patient verbalized understanding. Patient left ED via Discharge Method: ambulatory to Home with taxi. Opportunity for questions and clarification provided. Patient given 3 scripts. To continue your aftercare when you leave the hospital, you may receive an automated call from our care team to check in on how you are doing. This is a free service and part of our promise to provide the best care and service to meet your aftercare needs.  If you have questions, or wish to unsubscribe from this service please call [phone]. Thank you for Choosing our [facility] Emergency Department.         Primo De Jesus RN  03/25/23 1806

## 2023-03-26 NOTE — ED PROVIDER NOTES
Emergency Department Provider Note                   PCP:                None None               Age: 38 y.o. Sex: female     DISPOSITION Decision To Discharge 03/25/2023 10:46:27 PM       ICD-10-CM    1. Folliculitis  X50.4       2. Osteomyelitis of cervical spine (HCC)  M46.22       3. Drug abuse ([facility] [address]  F19.10 buprenorphine (SUBUTEX) 8 MG SUBL SL tablet          MEDICAL DECISION MAKING  Complexity of Problems Addressed:  Complexity of Problem: 1 acute, uncomplicated illness or injury. Data Reviewed and Analyzed:  Category 1:   I reviewed records from an external source: provider visit notes from outside specialist.  I reviewed records from an external source: previous lab results from outside ED. I reviewed records from an external source: previous imaging studies from outside ED. I ordered each unique test.  I interpreted the results of each unique test.        Category 2:       Category 3: Discussion of management or test interpretation. Patient given multiple options for care including readmission she wants to just be treated at home. No indication for antibiotics for the neck. She is wearing her Aspen collar as prescribed. I am putting her on antibiotics for the buttocks cellulitis which has potential to develop an abscess. She has history of substance abuse I am referring her to [facility] and starting her on Subutex       Risk of Complications and/or Morbidity of Patient Management:  OTC drug management performed, Prescription drug management performed, and Shared medical decision making was utilized in creating the patients health plan today. Pk Dawkins is a 38 y.o. female who presents to the Emergency Department with chief complaint of    Chief Complaint   Patient presents with    Medication Refill     Keflex, a second ABX, percocet, zofran      Patient has a history of possible chronic osteomyelitis of C2-C3 she was admitted to our hospital and left AMA.   She is now coming back and

## 2023-03-27 ENCOUNTER — TELEPHONE (OUTPATIENT)
Dept: INTERNAL MEDICINE CLINIC | Facility: CLINIC | Age: 43
End: 2023-03-27

## 2023-03-27 NOTE — TELEPHONE ENCOUNTER
Called patient to schedule TCV appt following discharge from [facility] 03/26/23. Dx Osteomyelitis of cervical spine      Per discharge patient is to follow up . Pt is not employed, has [address] which is not accepted in the [facility] network, and does not have a PCP. CM will provide information about the [facility] to pt. Pt does go to the [facility] Infectious Disease clinic.     1st attempt to contact patient using phone number listed in chart LVM

## 2023-03-28 LAB
BACTERIA SPEC CULT: ABNORMAL
BACTERIA SPEC CULT: ABNORMAL
BACTERIA SPEC CULT: NORMAL
GRAM STN SPEC: ABNORMAL
GRAM STN SPEC: ABNORMAL
SERVICE CMNT-IMP: ABNORMAL
SERVICE CMNT-IMP: NORMAL

## 2023-03-31 LAB
BACTERIA SPEC CULT: NORMAL
SERVICE CMNT-IMP: NORMAL

## 2023-04-03 LAB
BACTERIA SPEC CULT: NORMAL
SERVICE CMNT-IMP: NORMAL

## 2024-08-26 ENCOUNTER — HOSPITAL ENCOUNTER (EMERGENCY)
Age: 44
Discharge: HOME | End: 2024-08-26
Attending: EMERGENCY MEDICINE
Payer: COMMERCIAL

## 2024-08-26 VITALS
WEIGHT: 125 LBS | RESPIRATION RATE: 18 BRPM | SYSTOLIC BLOOD PRESSURE: 124 MMHG | TEMPERATURE: 98.9 F | HEIGHT: 67 IN | DIASTOLIC BLOOD PRESSURE: 94 MMHG | HEART RATE: 101 BPM | BODY MASS INDEX: 19.62 KG/M2 | OXYGEN SATURATION: 100 %

## 2024-08-26 DIAGNOSIS — L03.211 FACIAL CELLULITIS: ICD-10-CM

## 2024-08-26 DIAGNOSIS — L02.01 ABSCESS OF CHIN: Primary | ICD-10-CM

## 2024-08-26 PROCEDURE — 6370000000 HC RX 637 (ALT 250 FOR IP): Performed by: EMERGENCY MEDICINE

## 2024-08-26 PROCEDURE — 99283 EMERGENCY DEPT VISIT LOW MDM: CPT

## 2024-08-26 PROCEDURE — 10061 I&D ABSCESS COMP/MULTIPLE: CPT

## 2024-08-26 RX ORDER — DOXYCYCLINE HYCLATE 100 MG
100 TABLET ORAL 2 TIMES DAILY
Qty: 20 TABLET | Refills: 0 | Status: SHIPPED | OUTPATIENT
Start: 2024-08-26 | End: 2024-09-05

## 2024-08-26 RX ORDER — CEPHALEXIN 500 MG/1
500 CAPSULE ORAL 4 TIMES DAILY
Qty: 40 CAPSULE | Refills: 0 | Status: SHIPPED | OUTPATIENT
Start: 2024-08-26 | End: 2024-09-05

## 2024-08-26 RX ORDER — DOXYCYCLINE 100 MG/1
100 CAPSULE ORAL
Status: COMPLETED | OUTPATIENT
Start: 2024-08-26 | End: 2024-08-26

## 2024-08-26 RX ORDER — GINSENG 100 MG
CAPSULE ORAL
Status: COMPLETED | OUTPATIENT
Start: 2024-08-26 | End: 2024-08-26

## 2024-08-26 RX ORDER — IBUPROFEN 400 MG/1
400 TABLET, FILM COATED ORAL
Status: COMPLETED | OUTPATIENT
Start: 2024-08-26 | End: 2024-08-26

## 2024-08-26 RX ORDER — CEPHALEXIN 500 MG/1
500 CAPSULE ORAL
Status: COMPLETED | OUTPATIENT
Start: 2024-08-26 | End: 2024-08-26

## 2024-08-26 RX ADMIN — CEPHALEXIN 500 MG: 500 CAPSULE ORAL at 04:42

## 2024-08-26 RX ADMIN — DOXYCYCLINE HYCLATE 100 MG: 100 CAPSULE ORAL at 04:42

## 2024-08-26 RX ADMIN — IBUPROFEN 400 MG: 400 TABLET, FILM COATED ORAL at 04:42

## 2024-08-26 RX ADMIN — BACITRACIN: 500 OINTMENT TOPICAL at 04:43

## 2024-08-26 ASSESSMENT — PAIN SCALES - GENERAL
PAINLEVEL_OUTOF10: 7
PAINLEVEL_OUTOF10: 7

## 2024-08-26 ASSESSMENT — ENCOUNTER SYMPTOMS: COLOR CHANGE: 1

## 2024-08-26 ASSESSMENT — PAIN - FUNCTIONAL ASSESSMENT: PAIN_FUNCTIONAL_ASSESSMENT: 0-10

## 2024-08-26 NOTE — ED NOTES
Patient mobility status  with no difficulty. Provider aware     I have reviewed discharge instructions with the patient.  The patient verbalized understanding.    Patient left ED via Discharge Method: ambulatory to Home with   Opportunity for questions and clarification provided.     Patient given 2 scripts.

## 2024-08-26 NOTE — ED TRIAGE NOTES
Patient arrived pov c/o dental pain, and skin abscess on her chin that she tried to pop and made it swell. Swelling noted to right lower jaw  
Detail Level: Zone
Render Risk Assessment In Note?: no

## 2024-08-26 NOTE — ED PROVIDER NOTES
Emergency Department Provider Note       PCP: None, None   Age: 43 y.o.   Sex: female     DISPOSITION Discharge - Pending Orders Complete 08/26/2024 04:26:20 AM  Condition at Disposition: Stable       ICD-10-CM    1. Abscess of chin  L02.01       2. Facial cellulitis  L03.211           Medical Decision Making     Superficial skin abscess on the right chin.  Will perform incision and drainage and place patient on antibiotics.  She is allergic to Bactrim so Doxy to cover for MRSA and Keflex to cover for strep.  History of IV drug abuse.  Recent overdose but patient reports no current use-she was referred for treatment at [facility] but is not believing she needs Suboxone or methadone at this time     1 acute complicated illness or injury.  Prescription drug management performed.  Shared medical decision making was utilized in creating the patients health plan today.    I independently ordered and reviewed each unique test.                     History     Patient tried to pop the pimple on her chin and it has not drained anything but has become increasingly more painful red and swollen over the last 24 hours.  No fever no dental pain          ROS     Review of Systems   Constitutional:  Negative for fever.   Skin:  Positive for color change and wound.        Physical Exam     Vitals signs and nursing note reviewed:  Vitals:    08/26/24 0350   BP: (!) 124/94   Resp: 18   Temp: 98.9 °F (37.2 °C)   TempSrc: Oral   SpO2: 99%   Weight: 56.7 kg (125 lb)   Height: 1.702 m (5' 7\")      Physical Exam  Vitals and nursing note reviewed.   Constitutional:       General: She is not in acute distress.     Appearance: She is not ill-appearing, toxic-appearing or diaphoretic.   HENT:      Mouth/Throat:      Mouth: Mucous membranes are moist.      Pharynx: No oropharyngeal exudate or posterior oropharyngeal erythema.      Comments: Dentition intact, no swelling or tenderness in the underneath the tongue  Cardiovascular:      Rate and  range)   bacitracin ointment (has no administration in time range)       Current Discharge Medication List        START taking these medications    Details   cephALEXin (KEFLEX) 500 MG capsule Take 1 capsule by mouth 4 times daily for 10 days  Qty: 40 capsule, Refills: 0      doxycycline hyclate (VIBRA-TABS) 100 MG tablet Take 1 tablet by mouth 2 times daily for 10 days  Qty: 20 tablet, Refills: 0              No past medical history on file.     No past surgical history on file.     Social History     Socioeconomic History    Marital status:    Tobacco Use    Smoking status: Every Day     Current packs/day: 0.30     Types: Cigarettes    Smokeless tobacco: Never   Substance and Sexual Activity    Alcohol use: Not Currently    Drug use: Not Currently    Sexual activity: Yes     Partners: Female   Social History Narrative    Patient states that her and her wife live in a hotel at present     Social Determinants of Health     Social Connections: Unknown (1/10/2023)    Received from [email or web address]    Social Connections     Frequency of Communication with Friends and Family: Not asked     Frequency of Social Gatherings with Friends and Family: Not asked   Intimate Partner Violence: Unknown (1/10/2023)    Received from [email or web address]    Intimate Partner Violence     Fear of Current or Ex-Partner: Not asked     Emotionally Abused: Not asked     Physically Abused: Not asked     Sexually Abused: Not asked        Current Discharge Medication List        CONTINUE these medications which have NOT CHANGED    Details   ondansetron (ZOFRAN) 4 MG tablet Take 1 tablet by mouth 3 times daily as needed for Nausea or Vomiting  Qty: 15 tablet, Refills: 0              No results found for any visits on 08/26/24.      No orders to display                No results for input(s): \"COVID19\" in the last 72 hours.    Voice dictation software was used during the making of this note.  This software is not perfect and grammatical and other

## 2024-08-26 NOTE — DISCHARGE INSTRUCTIONS
Take both antibiotics as prescribed until complete.  Tylenol and Motrin for pain.  You may alternate them every 3-4 hours for best results.  Clean the wound twice daily with soap and water and apply over-the-counter bacitracin ointment and cover with a Band-Aid or nonadherent bandage.  Do this until healed.  Follow-up with your doctor the doctor provided in 1 week if not improving.  Return if any new, worsening or concerning symptoms

## 2024-10-22 ENCOUNTER — HOSPITAL ENCOUNTER (EMERGENCY)
Age: 44
Discharge: HOME | End: 2024-10-22
Attending: EMERGENCY MEDICINE
Payer: COMMERCIAL

## 2024-10-22 ENCOUNTER — APPOINTMENT (OUTPATIENT)
Dept: GENERAL RADIOLOGY | Age: 44
End: 2024-10-22
Payer: COMMERCIAL

## 2024-10-22 VITALS
OXYGEN SATURATION: 99 % | HEART RATE: 106 BPM | DIASTOLIC BLOOD PRESSURE: 83 MMHG | RESPIRATION RATE: 16 BRPM | SYSTOLIC BLOOD PRESSURE: 115 MMHG | TEMPERATURE: 97.8 F

## 2024-10-22 DIAGNOSIS — N39.0 URINARY TRACT INFECTION WITHOUT HEMATURIA, SITE UNSPECIFIED: ICD-10-CM

## 2024-10-22 DIAGNOSIS — K59.00 CONSTIPATION, UNSPECIFIED CONSTIPATION TYPE: ICD-10-CM

## 2024-10-22 DIAGNOSIS — R07.89 RIGHT-SIDED CHEST WALL PAIN: ICD-10-CM

## 2024-10-22 DIAGNOSIS — F19.10 POLYSUBSTANCE ABUSE (HCC): Primary | ICD-10-CM

## 2024-10-22 LAB
AMPHET UR QL SCN: POSITIVE
APPEARANCE UR: ABNORMAL
BACTERIA URNS QL MICRO: ABNORMAL /HPF
BARBITURATES UR QL SCN: NEGATIVE
BENZODIAZ UR QL: NEGATIVE
BILIRUB UR QL: NEGATIVE
CANNABINOIDS UR QL SCN: NEGATIVE
COCAINE UR QL SCN: POSITIVE
COLOR UR: ABNORMAL
EPI CELLS #/AREA URNS HPF: ABNORMAL /HPF
GLUCOSE UR STRIP.AUTO-MCNC: NEGATIVE MG/DL
HCG UR QL: NEGATIVE
HGB UR QL STRIP: NEGATIVE
KETONES UR QL STRIP.AUTO: NEGATIVE MG/DL
LEUKOCYTE ESTERASE UR QL STRIP.AUTO: ABNORMAL
METHADONE UR QL: NEGATIVE
NITRITE UR QL STRIP.AUTO: NEGATIVE
OPIATES UR QL: NEGATIVE
OTHER OBSERVATIONS: ABNORMAL
PCP UR QL: NEGATIVE
PH UR STRIP: 6 (ref 5–9)
PROT UR STRIP-MCNC: NEGATIVE MG/DL
RBC #/AREA URNS HPF: ABNORMAL /HPF
SP GR UR REFRACTOMETRY: 1.02 (ref 1–1.02)
UROBILINOGEN UR QL STRIP.AUTO: 2 EU/DL (ref 0.2–1)
WBC URNS QL MICRO: >100 /HPF

## 2024-10-22 PROCEDURE — 71046 X-RAY EXAM CHEST 2 VIEWS: CPT

## 2024-10-22 PROCEDURE — 81001 URINALYSIS AUTO W/SCOPE: CPT

## 2024-10-22 PROCEDURE — 6370000000 HC RX 637 (ALT 250 FOR IP): Performed by: EMERGENCY MEDICINE

## 2024-10-22 PROCEDURE — 81025 URINE PREGNANCY TEST: CPT

## 2024-10-22 PROCEDURE — 99284 EMERGENCY DEPT VISIT MOD MDM: CPT

## 2024-10-22 PROCEDURE — 80307 DRUG TEST PRSMV CHEM ANLYZR: CPT

## 2024-10-22 RX ORDER — CEPHALEXIN 500 MG/1
500 CAPSULE ORAL
Status: COMPLETED | OUTPATIENT
Start: 2024-10-22 | End: 2024-10-22

## 2024-10-22 RX ORDER — DOCUSATE SODIUM 100 MG/1
100 CAPSULE, LIQUID FILLED ORAL 2 TIMES DAILY
Qty: 60 CAPSULE | Refills: 0 | Status: SHIPPED | OUTPATIENT
Start: 2024-10-22

## 2024-10-22 RX ORDER — CEPHALEXIN 500 MG/1
500 CAPSULE ORAL 4 TIMES DAILY
Qty: 40 CAPSULE | Refills: 0 | Status: SHIPPED | OUTPATIENT
Start: 2024-10-22 | End: 2024-11-01

## 2024-10-22 RX ORDER — POLYETHYLENE GLYCOL 3350 17 G/17G
17 POWDER, FOR SOLUTION ORAL 2 TIMES DAILY
Qty: 578 G | Refills: 0 | Status: SHIPPED | OUTPATIENT
Start: 2024-10-22 | End: 2024-11-08

## 2024-10-22 RX ORDER — NAPROXEN 500 MG/1
500 TABLET ORAL 2 TIMES DAILY WITH MEALS
Qty: 28 TABLET | Refills: 0 | Status: SHIPPED | OUTPATIENT
Start: 2024-10-22 | End: 2024-11-05

## 2024-10-22 RX ORDER — NAPROXEN 250 MG/1
500 TABLET ORAL
Status: COMPLETED | OUTPATIENT
Start: 2024-10-22 | End: 2024-10-22

## 2024-10-22 RX ADMIN — CEPHALEXIN 500 MG: 500 CAPSULE ORAL at 08:43

## 2024-10-22 RX ADMIN — NAPROXEN 500 MG: 250 TABLET ORAL at 08:43

## 2024-10-22 ASSESSMENT — ENCOUNTER SYMPTOMS
CONSTIPATION: 0
ABDOMINAL PAIN: 0
EYE PAIN: 0
VOMITING: 0
BACK PAIN: 0
NAUSEA: 0
DIARRHEA: 0
TROUBLE SWALLOWING: 0
COUGH: 0
EYE ITCHING: 0
SINUS PAIN: 0
EYE REDNESS: 0
SHORTNESS OF BREATH: 0
WHEEZING: 0

## 2024-10-22 ASSESSMENT — PAIN - FUNCTIONAL ASSESSMENT
PAIN_FUNCTIONAL_ASSESSMENT: 0-10
PAIN_FUNCTIONAL_ASSESSMENT: NONE - DENIES PAIN

## 2024-10-22 ASSESSMENT — PAIN SCALES - GENERAL: PAINLEVEL_OUTOF10: 5

## 2024-10-22 NOTE — ED NOTES
Patient mobility status  with no difficulty. Provider aware     I have reviewed discharge instructions with the patient.  The patient verbalized understanding.    Patient left ED via Discharge Method: ambulatory to Home with Friend.    Opportunity for questions and clarification provided.     Patient given 0 scripts.      X 2 prescriptions sent to pharmacy

## 2024-10-22 NOTE — ED TRIAGE NOTES
Pt presents to ER via EMS. Patient was found to be passed out by [employer]. Patient was arousable with EMS and complaining of right flank pain. Pt told ems that she had taken what she thought to be heroin but doesn't believe is as she feels more anxious than calm. EMS endorses patient appeared paranoid and to be having hallucinations during transport. Patient now appears calm and cooperative.

## 2024-10-22 NOTE — DISCHARGE INSTRUCTIONS
DOU[facility]   [phone]   They have multiple resources to help you.  Please call.  [email or web address]     Other local resources that are available are:       [facility]    [phone]  [email or web address] for inpatient and outpatient substance abuse issues.    [facility] [phone]  Medication assisted treatment    [facility]  [phone]     Suicide Hotline   5-928-BRMNUMV     Narcotics Anonymous   www.na.org  Alcoholics Anonymous  www.aa.org    You must finish all the prescribed antibiotics.  THERE SHOULD BE NO LEFT OVER PILLS!!  Push fluids    Call your doctor or the follow up doctor to set up appointment for recheck visit  We would love to help you get a primary care doctor for follow-up after your emergency department visit.    Please call [phone] between 7AM - 6PM Monday to Friday.  A care navigator will be able to assist you with setting up a doctor close to your home.       Return to ER for any worsening symptoms or new problems which may arise

## 2024-10-22 NOTE — ED PROVIDER NOTES
Emergency Department Provider Note       PCP: None, None   Age: 43 y.o.   Sex: female     DISPOSITION Decision To Discharge 10/22/2024 08:16:17 AM  Condition at Disposition: Data Unavailable       ICD-10-CM    1. Polysubstance abuse (HCC)  F19.10       2. Urinary tract infection without hematuria, site unspecified  N39.0       3. Right-sided chest wall pain  R07.89       4. Constipation, unspecified constipation type  K59.00           Medical Decision Making     43-year-old female patient presents with concerns over altered mental status  At the time of my evaluation the patient is awake alert and oriented complaining of some right-sided pain mostly over her right ribs  Workup today revealed a negative chest x-ray.  Urine positive for bacteria  UDS positive as well  Patient will be treated with Keflex and naproxen  Referred for outpatient follow-up  Patient given information for favor as well for her substance abuse issues  ED Course as of 10/22/24 0820   Tue Oct 22, 2024   0627 Patient brought in by EMS, was roomed, then sent to the restroom to collect a urine.  Patient was missing for over an hour and a half with multiple trips either this MD, to the room, from which she was absent..      Patient returned to the room at 6:27, after a hour and 30-minute length of stay,  [JF]      ED Course User Index  [JF] Jayant Gasca MD     1 acute, uncomplicated illness or injury.  Prescription drug management performed.  Patient was discharged risks and benefits of hospitalization were considered.  Shared medical decision making was utilized in creating the patients health plan today.    I independently ordered and reviewed each unique test.  I reviewed external records: ED visit note from an outside group.     I interpreted the X-rays chest x-ray reveals no evidence of acute cardiopulmonary disease.  No rib fracture noted.              History     43-year-old female patient presents to the ER via EMS after being found

## 2024-11-20 ENCOUNTER — HOSPITAL ENCOUNTER (EMERGENCY)
Age: 44
Discharge: HOME | End: 2024-11-20
Attending: EMERGENCY MEDICINE
Payer: COMMERCIAL

## 2024-11-20 VITALS
HEIGHT: 67 IN | OXYGEN SATURATION: 99 % | TEMPERATURE: 97.2 F | SYSTOLIC BLOOD PRESSURE: 97 MMHG | DIASTOLIC BLOOD PRESSURE: 58 MMHG | HEART RATE: 94 BPM | BODY MASS INDEX: 21.19 KG/M2 | RESPIRATION RATE: 18 BRPM | WEIGHT: 135 LBS

## 2024-11-20 DIAGNOSIS — L03.211 FACIAL CELLULITIS: ICD-10-CM

## 2024-11-20 DIAGNOSIS — R51.9 ACUTE NONINTRACTABLE HEADACHE, UNSPECIFIED HEADACHE TYPE: Primary | ICD-10-CM

## 2024-11-20 PROCEDURE — 99283 EMERGENCY DEPT VISIT LOW MDM: CPT

## 2024-11-20 PROCEDURE — 6370000000 HC RX 637 (ALT 250 FOR IP): Performed by: EMERGENCY MEDICINE

## 2024-11-20 RX ORDER — METOCLOPRAMIDE 10 MG/1
10 TABLET ORAL ONCE
Status: COMPLETED | OUTPATIENT
Start: 2024-11-20 | End: 2024-11-20

## 2024-11-20 RX ORDER — CEPHALEXIN 500 MG/1
500 CAPSULE ORAL 3 TIMES DAILY
Qty: 21 CAPSULE | Refills: 0 | Status: SHIPPED | OUTPATIENT
Start: 2024-11-20 | End: 2024-11-27

## 2024-11-20 RX ORDER — DOXYCYCLINE 100 MG/1
100 CAPSULE ORAL 2 TIMES DAILY
Qty: 14 CAPSULE | Refills: 0 | Status: SHIPPED | OUTPATIENT
Start: 2024-11-20 | End: 2024-11-27

## 2024-11-20 RX ORDER — IBUPROFEN 600 MG/1
600 TABLET, FILM COATED ORAL
Status: COMPLETED | OUTPATIENT
Start: 2024-11-20 | End: 2024-11-20

## 2024-11-20 RX ORDER — ACETAMINOPHEN 500 MG
1000 TABLET ORAL ONCE
Status: COMPLETED | OUTPATIENT
Start: 2024-11-20 | End: 2024-11-20

## 2024-11-20 RX ADMIN — IBUPROFEN 600 MG: 600 TABLET, FILM COATED ORAL at 09:44

## 2024-11-20 RX ADMIN — ACETAMINOPHEN 1000 MG: 500 TABLET, FILM COATED ORAL at 09:44

## 2024-11-20 RX ADMIN — METOCLOPRAMIDE 10 MG: 10 TABLET ORAL at 09:44

## 2024-11-20 ASSESSMENT — PAIN - FUNCTIONAL ASSESSMENT: PAIN_FUNCTIONAL_ASSESSMENT: 0-10

## 2024-11-20 ASSESSMENT — PAIN SCALES - GENERAL
PAINLEVEL_OUTOF10: 6
PAINLEVEL_OUTOF10: 6

## 2024-11-20 ASSESSMENT — PAIN DESCRIPTION - LOCATION: LOCATION: HEAD

## 2024-11-20 NOTE — DISCHARGE INSTRUCTIONS
Please return for any questions, concerns or worsening symptoms, particularly increasing/unremitting pain, rapidly spreading redness, development of fever, development of nausea/vomiting, lethargy, ill appearance.    Please return for any questions, concerns or worsening symptoms, particularly weakness of the arms or legs, changes in behavior, repeated falls, increasing/unremitting dizziness, recurrent vomiting, difficulty speaking, increasing/unremitting headache.

## 2024-11-20 NOTE — ED NOTES
Patient mobility status  with no difficulty.     I have reviewed discharge instructions with the patient.  The patient verbalized understanding.    Patient left ED via Discharge Method: ambulatory to Home.    Opportunity for questions and clarification provided.     Patient given 2 scripts.

## 2024-11-20 NOTE — ED PROVIDER NOTES
CAPSULE    Take 1 capsule by mouth 2 times daily    NAPROXEN (NAPROSYN) 500 MG TABLET    Take 1 tablet by mouth 2 times daily (with meals) for 14 days    ONDANSETRON (ZOFRAN) 4 MG TABLET    Take 1 tablet by mouth 3 times daily as needed for Nausea or Vomiting      Allergies:   Allergies   Allergen Reactions    Carbamazepine Other (See Comments)    Sulfamethoxazole-Trimethoprim Shortness Of Breath       Physical Exam   Vitals signs reviewed.   Patient Vitals for the past 4 hrs:   Temp Pulse Resp BP SpO2   11/20/24 0925 97.2 °F (36.2 °C) 94 18 (!) 97/58 99 %     General: Alert and oriented ×4, no acute distress   Eyes: Anicteric, conjunctiva pink, PERRLA, EOMI  ENT: No nasal discharge, no gross nasal congestion present  Pulmonary: Clear to auscultation bilaterally with symmetric chest rise, no increased work of breathing, no accessory muscle use  Cardiovascular: Regular rate and rhythm, no rub or gallop appreciated on my exam  GI: Abdomen is soft, nontender, nondistended  Musculoskeletal: No obvious joint deformity or joint effusion, normal joint range of motion  Neuro: Cranial nerves II through VII grossly intact, strength and sensation is grossly intact in the upper and lower extremities bilaterally  Skin: Skin is warm and dry, area of acne in the right lower face with mild ulceration, surrounding mild induration and increased calor, no discharge, no noted fluctuance    Procedures  No results found for any visits on 11/20/24.   No orders to display                   Voice dictation software was used during the making of this note.  This software is not perfect and grammatical and other typographical errors may be present.  This note has not been completely proofread for errors.     Pako Hensley MD  11/20/24 5600

## 2024-11-20 NOTE — ED TRIAGE NOTES
Pt reports history of smoking opiates. Pt found sleeping in bathroom at [facility] by EMS. Pt gcs 15. Requested to come to ear for on right chin wounds with increasing pain/infection.
